# Patient Record
Sex: MALE | Race: WHITE | Employment: UNEMPLOYED
[De-identification: names, ages, dates, MRNs, and addresses within clinical notes are randomized per-mention and may not be internally consistent; named-entity substitution may affect disease eponyms.]

---

## 2017-02-14 LAB
ALBUMIN SERPL-MCNC: 4.3 G/DL
ALP BLD-CCNC: 101 U/L
ALT SERPL-CCNC: 23 U/L
AST SERPL-CCNC: 29 U/L
BASOPHILS ABSOLUTE: 0.1 /ΜL
BASOPHILS RELATIVE PERCENT: 0.7 %
BILIRUB SERPL-MCNC: 0.5 MG/DL (ref 0.1–1.4)
BUN BLDV-MCNC: 21 MG/DL
CALCIUM SERPL-MCNC: 9.3 MG/DL
CHLORIDE BLD-SCNC: 103 MMOL/L
CHOLESTEROL, TOTAL: 166 MG/DL
CHOLESTEROL/HDL RATIO: 3.1
CO2: 29 MMOL/L
CREAT SERPL-MCNC: 1.5 MG/DL
EOSINOPHILS ABSOLUTE: 0.5 /ΜL
EOSINOPHILS RELATIVE PERCENT: 6.3 %
FERRITIN: 137 NG/ML (ref 18–300)
GFR CALCULATED: NORMAL
GLUCOSE BLD-MCNC: 91 MG/DL
HCT VFR BLD CALC: 39.8 % (ref 41–53)
HDLC SERPL-MCNC: 53 MG/DL (ref 35–70)
HEMOGLOBIN: 13.3 G/DL (ref 13.5–17.5)
IRON: 82
LDL CHOLESTEROL CALCULATED: 87 MG/DL (ref 0–160)
LYMPHOCYTES ABSOLUTE: 1.2 /ΜL
LYMPHOCYTES RELATIVE PERCENT: 13.9 %
MCH RBC QN AUTO: 29.8 PG
MCHC RBC AUTO-ENTMCNC: 33.5 G/DL
MCV RBC AUTO: 89 FL
MONOCYTES ABSOLUTE: 0.5 /ΜL
MONOCYTES RELATIVE PERCENT: 6.2 %
NEUTROPHILS ABSOLUTE: 6.2 /ΜL
NEUTROPHILS RELATIVE PERCENT: 72.9 %
PDW BLD-RTO: 13.1 %
PLATELET # BLD: 238 K/ΜL
PMV BLD AUTO: 8.8 FL
POTASSIUM SERPL-SCNC: 5.1 MMOL/L
RBC # BLD: 4.47 10^6/ΜL
SODIUM BLD-SCNC: 139 MMOL/L
TOTAL IRON BINDING CAPACITY: 354
TOTAL PROTEIN: 7.4
TRIGL SERPL-MCNC: 128 MG/DL
TSH SERPL DL<=0.05 MIU/L-ACNC: 1.36 UIU/ML
URIC ACID: 6.8
VITAMIN B-12: 323
VITAMIN D 25-HYDROXY: 34.4
VITAMIN D2, 25 HYDROXY: NORMAL
VITAMIN D3,25 HYDROXY: NORMAL
VLDLC SERPL CALC-MCNC: 26 MG/DL
WBC # BLD: 8.4 10^3/ML

## 2017-02-16 DIAGNOSIS — E78.5 HYPERLIPIDEMIA, UNSPECIFIED HYPERLIPIDEMIA TYPE: ICD-10-CM

## 2017-02-16 DIAGNOSIS — F41.9 ANXIETY: ICD-10-CM

## 2017-02-16 DIAGNOSIS — Z23 NEED FOR PNEUMOCOCCAL VACCINATION: ICD-10-CM

## 2017-02-16 DIAGNOSIS — I10 ESSENTIAL HYPERTENSION: ICD-10-CM

## 2017-02-16 DIAGNOSIS — J41.0 SIMPLE CHRONIC BRONCHITIS (HCC): ICD-10-CM

## 2017-02-16 DIAGNOSIS — N18.30 CKD (CHRONIC KIDNEY DISEASE) STAGE 3, GFR 30-59 ML/MIN (HCC): ICD-10-CM

## 2017-02-16 DIAGNOSIS — N28.9 RENAL INSUFFICIENCY: ICD-10-CM

## 2017-02-16 DIAGNOSIS — D64.9 ANEMIA, UNSPECIFIED TYPE: ICD-10-CM

## 2017-02-17 ENCOUNTER — OFFICE VISIT (OUTPATIENT)
Dept: FAMILY MEDICINE CLINIC | Facility: CLINIC | Age: 67
End: 2017-02-17

## 2017-02-17 VITALS
SYSTOLIC BLOOD PRESSURE: 130 MMHG | HEIGHT: 67 IN | HEART RATE: 83 BPM | OXYGEN SATURATION: 95 % | WEIGHT: 172.4 LBS | DIASTOLIC BLOOD PRESSURE: 80 MMHG | TEMPERATURE: 97.2 F | BODY MASS INDEX: 27.06 KG/M2

## 2017-02-17 DIAGNOSIS — Z23 NEED FOR TDAP VACCINATION: ICD-10-CM

## 2017-02-17 DIAGNOSIS — E87.5 HYPERKALEMIA: ICD-10-CM

## 2017-02-17 DIAGNOSIS — N18.30 CKD (CHRONIC KIDNEY DISEASE) STAGE 3, GFR 30-59 ML/MIN (HCC): ICD-10-CM

## 2017-02-17 DIAGNOSIS — I10 ESSENTIAL HYPERTENSION: ICD-10-CM

## 2017-02-17 DIAGNOSIS — L30.9 DERMATITIS: ICD-10-CM

## 2017-02-17 DIAGNOSIS — D64.9 ANEMIA, UNSPECIFIED TYPE: ICD-10-CM

## 2017-02-17 DIAGNOSIS — F41.9 ANXIETY: ICD-10-CM

## 2017-02-17 DIAGNOSIS — J41.0 SIMPLE CHRONIC BRONCHITIS (HCC): ICD-10-CM

## 2017-02-17 DIAGNOSIS — E78.5 HYPERLIPIDEMIA, UNSPECIFIED HYPERLIPIDEMIA TYPE: ICD-10-CM

## 2017-02-17 DIAGNOSIS — M1A.9XX0 CHRONIC GOUT WITHOUT TOPHUS, UNSPECIFIED CAUSE, UNSPECIFIED SITE: Primary | ICD-10-CM

## 2017-02-17 PROCEDURE — G8427 DOCREV CUR MEDS BY ELIG CLIN: HCPCS | Performed by: PHYSICIAN ASSISTANT

## 2017-02-17 PROCEDURE — 90715 TDAP VACCINE 7 YRS/> IM: CPT | Performed by: PHYSICIAN ASSISTANT

## 2017-02-17 PROCEDURE — G8599 NO ASA/ANTIPLAT THER USE RNG: HCPCS | Performed by: PHYSICIAN ASSISTANT

## 2017-02-17 PROCEDURE — G8484 FLU IMMUNIZE NO ADMIN: HCPCS | Performed by: PHYSICIAN ASSISTANT

## 2017-02-17 PROCEDURE — G8420 CALC BMI NORM PARAMETERS: HCPCS | Performed by: PHYSICIAN ASSISTANT

## 2017-02-17 PROCEDURE — 90471 IMMUNIZATION ADMIN: CPT | Performed by: PHYSICIAN ASSISTANT

## 2017-02-17 PROCEDURE — 3017F COLORECTAL CA SCREEN DOC REV: CPT | Performed by: PHYSICIAN ASSISTANT

## 2017-02-17 PROCEDURE — 96372 THER/PROPH/DIAG INJ SC/IM: CPT | Performed by: PHYSICIAN ASSISTANT

## 2017-02-17 PROCEDURE — 99214 OFFICE O/P EST MOD 30 MIN: CPT | Performed by: PHYSICIAN ASSISTANT

## 2017-02-17 PROCEDURE — 1123F ACP DISCUSS/DSCN MKR DOCD: CPT | Performed by: PHYSICIAN ASSISTANT

## 2017-02-17 PROCEDURE — 3023F SPIROM DOC REV: CPT | Performed by: PHYSICIAN ASSISTANT

## 2017-02-17 PROCEDURE — 4004F PT TOBACCO SCREEN RCVD TLK: CPT | Performed by: PHYSICIAN ASSISTANT

## 2017-02-17 PROCEDURE — G8926 SPIRO NO PERF OR DOC: HCPCS | Performed by: PHYSICIAN ASSISTANT

## 2017-02-17 PROCEDURE — 4040F PNEUMOC VAC/ADMIN/RCVD: CPT | Performed by: PHYSICIAN ASSISTANT

## 2017-02-17 RX ORDER — AMMONIUM LACTATE 12 G/100G
LOTION TOPICAL
Qty: 222 ML | Refills: 5 | Status: SHIPPED | OUTPATIENT
Start: 2017-02-17 | End: 2018-07-06

## 2017-02-17 RX ORDER — TRIAMCINOLONE ACETONIDE 40 MG/ML
40 INJECTION, SUSPENSION INTRA-ARTICULAR; INTRAMUSCULAR ONCE
Status: COMPLETED | OUTPATIENT
Start: 2017-02-17 | End: 2017-02-17

## 2017-02-17 RX ORDER — ALPRAZOLAM 0.25 MG/1
TABLET ORAL
Qty: 90 TABLET | Refills: 2 | Status: SHIPPED | OUTPATIENT
Start: 2017-02-17 | End: 2017-05-16 | Stop reason: SDUPTHER

## 2017-02-17 RX ORDER — TRAMADOL HYDROCHLORIDE 50 MG/1
TABLET ORAL
Qty: 240 TABLET | Refills: 2 | Status: SHIPPED | OUTPATIENT
Start: 2017-02-17 | End: 2017-05-16 | Stop reason: SDUPTHER

## 2017-02-17 RX ADMIN — TRIAMCINOLONE ACETONIDE 40 MG: 40 INJECTION, SUSPENSION INTRA-ARTICULAR; INTRAMUSCULAR at 16:04

## 2017-02-17 ASSESSMENT — PATIENT HEALTH QUESTIONNAIRE - PHQ9
SUM OF ALL RESPONSES TO PHQ9 QUESTIONS 1 & 2: 1
1. LITTLE INTEREST OR PLEASURE IN DOING THINGS: 0
SUM OF ALL RESPONSES TO PHQ QUESTIONS 1-9: 1
2. FEELING DOWN, DEPRESSED OR HOPELESS: 1

## 2017-02-18 ASSESSMENT — ENCOUNTER SYMPTOMS
RHINORRHEA: 0
DIARRHEA: 0
COUGH: 0
EYE DISCHARGE: 0
NAUSEA: 0
SORE THROAT: 0
VOMITING: 0
SINUS PRESSURE: 0
CHANGE IN BOWEL HABIT: 0
SHORTNESS OF BREATH: 0
CHEST TIGHTNESS: 0
EYE ITCHING: 0
ABDOMINAL PAIN: 0

## 2017-03-06 PROBLEM — I51.7 LAE (LEFT ATRIAL ENLARGEMENT): Status: ACTIVE | Noted: 2017-03-06

## 2017-03-06 PROBLEM — I51.7 LVH (LEFT VENTRICULAR HYPERTROPHY): Status: ACTIVE | Noted: 2017-03-06

## 2017-03-13 RX ORDER — CLOBETASOL PROPIONATE 0.46 MG/ML
SOLUTION TOPICAL
Qty: 50 ML | Refills: 1 | Status: SHIPPED | OUTPATIENT
Start: 2017-03-13 | End: 2017-06-04 | Stop reason: SDUPTHER

## 2017-04-26 RX ORDER — TRIAMCINOLONE ACETONIDE 1 MG/G
CREAM TOPICAL
Qty: 1 TUBE | Refills: 1 | Status: SHIPPED | OUTPATIENT
Start: 2017-04-26 | End: 2017-05-16 | Stop reason: SDUPTHER

## 2017-04-26 RX ORDER — SIMVASTATIN 20 MG
TABLET ORAL
Qty: 30 TABLET | Refills: 5 | Status: SHIPPED | OUTPATIENT
Start: 2017-04-26 | End: 2017-10-18 | Stop reason: SDUPTHER

## 2017-05-16 ENCOUNTER — TELEPHONE (OUTPATIENT)
Dept: FAMILY MEDICINE CLINIC | Age: 67
End: 2017-05-16

## 2017-05-16 ENCOUNTER — OFFICE VISIT (OUTPATIENT)
Dept: FAMILY MEDICINE CLINIC | Age: 67
End: 2017-05-16
Payer: MEDICARE

## 2017-05-16 VITALS
HEIGHT: 67 IN | BODY MASS INDEX: 26.19 KG/M2 | SYSTOLIC BLOOD PRESSURE: 130 MMHG | WEIGHT: 166.9 LBS | DIASTOLIC BLOOD PRESSURE: 80 MMHG | HEART RATE: 100 BPM | OXYGEN SATURATION: 97 % | TEMPERATURE: 97.5 F

## 2017-05-16 DIAGNOSIS — N18.30 CKD (CHRONIC KIDNEY DISEASE) STAGE 3, GFR 30-59 ML/MIN (HCC): ICD-10-CM

## 2017-05-16 DIAGNOSIS — I42.9 CARDIOMYOPATHY (HCC): Chronic | ICD-10-CM

## 2017-05-16 DIAGNOSIS — I25.5 ISCHEMIC CARDIOMYOPATHY: ICD-10-CM

## 2017-05-16 DIAGNOSIS — D64.9 ANEMIA, UNSPECIFIED TYPE: ICD-10-CM

## 2017-05-16 DIAGNOSIS — E78.5 HYPERLIPIDEMIA, UNSPECIFIED HYPERLIPIDEMIA TYPE: ICD-10-CM

## 2017-05-16 DIAGNOSIS — J41.0 SIMPLE CHRONIC BRONCHITIS (HCC): ICD-10-CM

## 2017-05-16 DIAGNOSIS — I10 ESSENTIAL HYPERTENSION: Primary | Chronic | ICD-10-CM

## 2017-05-16 DIAGNOSIS — F41.9 ANXIETY: ICD-10-CM

## 2017-05-16 DIAGNOSIS — N28.9 RENAL INSUFFICIENCY: ICD-10-CM

## 2017-05-16 DIAGNOSIS — M1A.3790 CHRONIC GOUT DUE TO RENAL IMPAIRMENT INVOLVING FOOT WITHOUT TOPHUS, UNSPECIFIED LATERALITY: ICD-10-CM

## 2017-05-16 DIAGNOSIS — Z12.5 SCREENING FOR PROSTATE CANCER: ICD-10-CM

## 2017-05-16 PROCEDURE — 4004F PT TOBACCO SCREEN RCVD TLK: CPT | Performed by: PHYSICIAN ASSISTANT

## 2017-05-16 PROCEDURE — G8427 DOCREV CUR MEDS BY ELIG CLIN: HCPCS | Performed by: PHYSICIAN ASSISTANT

## 2017-05-16 PROCEDURE — G8420 CALC BMI NORM PARAMETERS: HCPCS | Performed by: PHYSICIAN ASSISTANT

## 2017-05-16 PROCEDURE — 1123F ACP DISCUSS/DSCN MKR DOCD: CPT | Performed by: PHYSICIAN ASSISTANT

## 2017-05-16 PROCEDURE — G8599 NO ASA/ANTIPLAT THER USE RNG: HCPCS | Performed by: PHYSICIAN ASSISTANT

## 2017-05-16 PROCEDURE — 3017F COLORECTAL CA SCREEN DOC REV: CPT | Performed by: PHYSICIAN ASSISTANT

## 2017-05-16 PROCEDURE — 3023F SPIROM DOC REV: CPT | Performed by: PHYSICIAN ASSISTANT

## 2017-05-16 PROCEDURE — G8926 SPIRO NO PERF OR DOC: HCPCS | Performed by: PHYSICIAN ASSISTANT

## 2017-05-16 PROCEDURE — 4040F PNEUMOC VAC/ADMIN/RCVD: CPT | Performed by: PHYSICIAN ASSISTANT

## 2017-05-16 PROCEDURE — 99214 OFFICE O/P EST MOD 30 MIN: CPT | Performed by: PHYSICIAN ASSISTANT

## 2017-05-16 RX ORDER — TRIAMCINOLONE ACETONIDE 1 MG/G
CREAM TOPICAL
Qty: 1 TUBE | Refills: 1 | Status: SHIPPED | OUTPATIENT
Start: 2017-05-16 | End: 2018-05-02 | Stop reason: ALTCHOICE

## 2017-05-16 RX ORDER — ALPRAZOLAM 0.25 MG/1
TABLET ORAL
Qty: 90 TABLET | Refills: 2 | Status: SHIPPED | OUTPATIENT
Start: 2017-05-16 | End: 2017-08-15 | Stop reason: SDUPTHER

## 2017-05-16 RX ORDER — TRAMADOL HYDROCHLORIDE 50 MG/1
TABLET ORAL
Qty: 240 TABLET | Refills: 2 | Status: SHIPPED | OUTPATIENT
Start: 2017-05-16 | End: 2017-08-15 | Stop reason: SDUPTHER

## 2017-05-16 RX ORDER — TRIAMCINOLONE ACETONIDE 40 MG/ML
40 INJECTION, SUSPENSION INTRA-ARTICULAR; INTRAMUSCULAR ONCE
Status: COMPLETED | OUTPATIENT
Start: 2017-05-16 | End: 2017-05-16

## 2017-05-16 RX ADMIN — TRIAMCINOLONE ACETONIDE 40 MG: 40 INJECTION, SUSPENSION INTRA-ARTICULAR; INTRAMUSCULAR at 15:13

## 2017-05-16 ASSESSMENT — ENCOUNTER SYMPTOMS
CHEST TIGHTNESS: 0
CHANGE IN BOWEL HABIT: 0
EYE DISCHARGE: 0
EYE ITCHING: 0
SINUS PRESSURE: 0
NAUSEA: 0
ABDOMINAL PAIN: 0

## 2017-05-23 ENCOUNTER — OFFICE VISIT (OUTPATIENT)
Dept: FAMILY MEDICINE CLINIC | Age: 67
End: 2017-05-23
Payer: MEDICARE

## 2017-05-23 VITALS
SYSTOLIC BLOOD PRESSURE: 125 MMHG | WEIGHT: 162 LBS | HEIGHT: 67 IN | HEART RATE: 90 BPM | BODY MASS INDEX: 25.43 KG/M2 | OXYGEN SATURATION: 97 % | TEMPERATURE: 97.9 F | DIASTOLIC BLOOD PRESSURE: 82 MMHG

## 2017-05-23 DIAGNOSIS — M79.672 LEFT FOOT PAIN: Primary | ICD-10-CM

## 2017-05-23 PROCEDURE — 4004F PT TOBACCO SCREEN RCVD TLK: CPT | Performed by: PHYSICIAN ASSISTANT

## 2017-05-23 PROCEDURE — 1123F ACP DISCUSS/DSCN MKR DOCD: CPT | Performed by: PHYSICIAN ASSISTANT

## 2017-05-23 PROCEDURE — 4040F PNEUMOC VAC/ADMIN/RCVD: CPT | Performed by: PHYSICIAN ASSISTANT

## 2017-05-23 PROCEDURE — G8427 DOCREV CUR MEDS BY ELIG CLIN: HCPCS | Performed by: PHYSICIAN ASSISTANT

## 2017-05-23 PROCEDURE — 99213 OFFICE O/P EST LOW 20 MIN: CPT | Performed by: PHYSICIAN ASSISTANT

## 2017-05-23 PROCEDURE — G8420 CALC BMI NORM PARAMETERS: HCPCS | Performed by: PHYSICIAN ASSISTANT

## 2017-05-23 PROCEDURE — G8599 NO ASA/ANTIPLAT THER USE RNG: HCPCS | Performed by: PHYSICIAN ASSISTANT

## 2017-05-23 PROCEDURE — 3017F COLORECTAL CA SCREEN DOC REV: CPT | Performed by: PHYSICIAN ASSISTANT

## 2017-05-23 RX ORDER — PREDNISONE 20 MG/1
TABLET ORAL
Qty: 18 TABLET | Refills: 0 | Status: SHIPPED | OUTPATIENT
Start: 2017-05-23 | End: 2017-06-02

## 2017-05-23 ASSESSMENT — ENCOUNTER SYMPTOMS
CHEST TIGHTNESS: 0
SORE THROAT: 0
VOMITING: 0
NAUSEA: 0
SINUS PRESSURE: 0
ABDOMINAL PAIN: 0
SWOLLEN GLANDS: 0
VISUAL CHANGE: 0
EYE ITCHING: 0
EYE DISCHARGE: 0
COUGH: 0
CHANGE IN BOWEL HABIT: 0

## 2017-06-30 RX ORDER — LISINOPRIL 10 MG/1
TABLET ORAL
Qty: 90 TABLET | Refills: 3 | Status: SHIPPED | OUTPATIENT
Start: 2017-06-30 | End: 2018-06-27 | Stop reason: SDUPTHER

## 2017-08-15 ENCOUNTER — OFFICE VISIT (OUTPATIENT)
Dept: FAMILY MEDICINE CLINIC | Age: 67
End: 2017-08-15
Payer: MEDICARE

## 2017-08-15 VITALS
HEART RATE: 85 BPM | BODY MASS INDEX: 26.21 KG/M2 | SYSTOLIC BLOOD PRESSURE: 130 MMHG | HEIGHT: 67 IN | DIASTOLIC BLOOD PRESSURE: 71 MMHG | TEMPERATURE: 98.7 F | WEIGHT: 167 LBS | OXYGEN SATURATION: 97 %

## 2017-08-15 DIAGNOSIS — F41.9 ANXIETY: ICD-10-CM

## 2017-08-15 DIAGNOSIS — G89.29 CHRONIC SHOULDER PAIN, UNSPECIFIED LATERALITY: ICD-10-CM

## 2017-08-15 DIAGNOSIS — Z23 NEED FOR INFLUENZA VACCINATION: ICD-10-CM

## 2017-08-15 DIAGNOSIS — N18.30 CKD (CHRONIC KIDNEY DISEASE) STAGE 3, GFR 30-59 ML/MIN (HCC): ICD-10-CM

## 2017-08-15 DIAGNOSIS — M25.519 CHRONIC SHOULDER PAIN, UNSPECIFIED LATERALITY: ICD-10-CM

## 2017-08-15 DIAGNOSIS — J41.0 SIMPLE CHRONIC BRONCHITIS (HCC): ICD-10-CM

## 2017-08-15 DIAGNOSIS — L40.9 PSORIASIS: ICD-10-CM

## 2017-08-15 DIAGNOSIS — M1A.3790 CHRONIC GOUT DUE TO RENAL IMPAIRMENT INVOLVING FOOT WITHOUT TOPHUS, UNSPECIFIED LATERALITY: ICD-10-CM

## 2017-08-15 DIAGNOSIS — D64.9 ANEMIA, UNSPECIFIED TYPE: ICD-10-CM

## 2017-08-15 DIAGNOSIS — E78.5 HYPERLIPIDEMIA, UNSPECIFIED HYPERLIPIDEMIA TYPE: ICD-10-CM

## 2017-08-15 DIAGNOSIS — N28.9 RENAL INSUFFICIENCY: ICD-10-CM

## 2017-08-15 DIAGNOSIS — I10 ESSENTIAL HYPERTENSION: Primary | Chronic | ICD-10-CM

## 2017-08-15 DIAGNOSIS — F10.10 ALCOHOL ABUSE: ICD-10-CM

## 2017-08-15 DIAGNOSIS — F10.29 ALCOHOL DEPENDENCE WITH UNSPECIFIED ALCOHOL-INDUCED DISORDER (HCC): ICD-10-CM

## 2017-08-15 PROCEDURE — G8427 DOCREV CUR MEDS BY ELIG CLIN: HCPCS | Performed by: PHYSICIAN ASSISTANT

## 2017-08-15 PROCEDURE — G8599 NO ASA/ANTIPLAT THER USE RNG: HCPCS | Performed by: PHYSICIAN ASSISTANT

## 2017-08-15 PROCEDURE — G0008 ADMIN INFLUENZA VIRUS VAC: HCPCS | Performed by: PHYSICIAN ASSISTANT

## 2017-08-15 PROCEDURE — 3023F SPIROM DOC REV: CPT | Performed by: PHYSICIAN ASSISTANT

## 2017-08-15 PROCEDURE — 99213 OFFICE O/P EST LOW 20 MIN: CPT | Performed by: PHYSICIAN ASSISTANT

## 2017-08-15 PROCEDURE — 96372 THER/PROPH/DIAG INJ SC/IM: CPT | Performed by: PHYSICIAN ASSISTANT

## 2017-08-15 PROCEDURE — G8926 SPIRO NO PERF OR DOC: HCPCS | Performed by: PHYSICIAN ASSISTANT

## 2017-08-15 PROCEDURE — 4040F PNEUMOC VAC/ADMIN/RCVD: CPT | Performed by: PHYSICIAN ASSISTANT

## 2017-08-15 PROCEDURE — G8419 CALC BMI OUT NRM PARAM NOF/U: HCPCS | Performed by: PHYSICIAN ASSISTANT

## 2017-08-15 PROCEDURE — 90662 IIV NO PRSV INCREASED AG IM: CPT | Performed by: PHYSICIAN ASSISTANT

## 2017-08-15 PROCEDURE — 4004F PT TOBACCO SCREEN RCVD TLK: CPT | Performed by: PHYSICIAN ASSISTANT

## 2017-08-15 PROCEDURE — 1123F ACP DISCUSS/DSCN MKR DOCD: CPT | Performed by: PHYSICIAN ASSISTANT

## 2017-08-15 PROCEDURE — 3017F COLORECTAL CA SCREEN DOC REV: CPT | Performed by: PHYSICIAN ASSISTANT

## 2017-08-15 RX ORDER — TRAMADOL HYDROCHLORIDE 50 MG/1
TABLET ORAL
Qty: 240 TABLET | Refills: 2 | Status: SHIPPED | OUTPATIENT
Start: 2017-08-15 | End: 2017-11-14 | Stop reason: SDUPTHER

## 2017-08-15 RX ORDER — TRIAMCINOLONE ACETONIDE 40 MG/ML
40 INJECTION, SUSPENSION INTRA-ARTICULAR; INTRAMUSCULAR ONCE
Status: COMPLETED | OUTPATIENT
Start: 2017-08-15 | End: 2017-08-15

## 2017-08-15 RX ORDER — ALPRAZOLAM 0.25 MG/1
TABLET ORAL
Qty: 90 TABLET | Refills: 2 | Status: SHIPPED | OUTPATIENT
Start: 2017-08-15 | End: 2017-11-14 | Stop reason: SDUPTHER

## 2017-08-15 RX ADMIN — TRIAMCINOLONE ACETONIDE 40 MG: 40 INJECTION, SUSPENSION INTRA-ARTICULAR; INTRAMUSCULAR at 14:53

## 2017-08-15 ASSESSMENT — ENCOUNTER SYMPTOMS
RHINORRHEA: 0
CHANGE IN BOWEL HABIT: 0
EYE ITCHING: 0
SINUS PRESSURE: 0
CHEST TIGHTNESS: 0
VOMITING: 0
ABDOMINAL PAIN: 0
DIARRHEA: 0
NAUSEA: 0
BLURRED VISION: 0
EYE DISCHARGE: 0
SHORTNESS OF BREATH: 0
SORE THROAT: 0
COUGH: 0
ORTHOPNEA: 0

## 2017-09-11 PROBLEM — R93.1 ABNORMAL ECHOCARDIOGRAM: Status: ACTIVE | Noted: 2017-09-11

## 2017-10-18 RX ORDER — SIMVASTATIN 20 MG
TABLET ORAL
Qty: 30 TABLET | Refills: 5 | Status: SHIPPED | OUTPATIENT
Start: 2017-10-18 | End: 2018-04-17 | Stop reason: SDUPTHER

## 2017-11-13 LAB
BUN BLDV-MCNC: 20 MG/DL
CALCIUM SERPL-MCNC: 9.1 MG/DL
CHLORIDE BLD-SCNC: 101 MMOL/L
CO2: 30 MMOL/L
CREAT SERPL-MCNC: 1.55 MG/DL
GFR CALCULATED: 45
GLUCOSE BLD-MCNC: 84 MG/DL
POTASSIUM SERPL-SCNC: 4.3 MMOL/L
SODIUM BLD-SCNC: 138 MMOL/L

## 2017-11-14 ENCOUNTER — OFFICE VISIT (OUTPATIENT)
Dept: FAMILY MEDICINE CLINIC | Age: 67
End: 2017-11-14
Payer: MEDICARE

## 2017-11-14 VITALS
SYSTOLIC BLOOD PRESSURE: 122 MMHG | OXYGEN SATURATION: 97 % | HEART RATE: 96 BPM | BODY MASS INDEX: 27 KG/M2 | DIASTOLIC BLOOD PRESSURE: 80 MMHG | TEMPERATURE: 97.6 F | HEIGHT: 67 IN | WEIGHT: 172 LBS

## 2017-11-14 DIAGNOSIS — J41.0 SIMPLE CHRONIC BRONCHITIS (HCC): ICD-10-CM

## 2017-11-14 DIAGNOSIS — I25.5 ISCHEMIC CARDIOMYOPATHY: Chronic | ICD-10-CM

## 2017-11-14 DIAGNOSIS — I10 ESSENTIAL HYPERTENSION: Primary | Chronic | ICD-10-CM

## 2017-11-14 DIAGNOSIS — Z13.6 SCREENING FOR AAA (ABDOMINAL AORTIC ANEURYSM): ICD-10-CM

## 2017-11-14 DIAGNOSIS — E78.5 HYPERLIPIDEMIA, UNSPECIFIED HYPERLIPIDEMIA TYPE: ICD-10-CM

## 2017-11-14 DIAGNOSIS — Z11.59 NEED FOR HEPATITIS C SCREENING TEST: ICD-10-CM

## 2017-11-14 DIAGNOSIS — F41.9 ANXIETY: ICD-10-CM

## 2017-11-14 DIAGNOSIS — D64.9 ANEMIA, UNSPECIFIED TYPE: ICD-10-CM

## 2017-11-14 DIAGNOSIS — M1A.9XX0 CHRONIC GOUT WITHOUT TOPHUS, UNSPECIFIED CAUSE, UNSPECIFIED SITE: ICD-10-CM

## 2017-11-14 DIAGNOSIS — L08.9 SKIN INFLAMMATION: ICD-10-CM

## 2017-11-14 DIAGNOSIS — G89.29 OTHER CHRONIC PAIN: ICD-10-CM

## 2017-11-14 DIAGNOSIS — N18.30 CKD (CHRONIC KIDNEY DISEASE) STAGE 3, GFR 30-59 ML/MIN (HCC): ICD-10-CM

## 2017-11-14 PROCEDURE — 3023F SPIROM DOC REV: CPT | Performed by: PHYSICIAN ASSISTANT

## 2017-11-14 PROCEDURE — 1123F ACP DISCUSS/DSCN MKR DOCD: CPT | Performed by: PHYSICIAN ASSISTANT

## 2017-11-14 PROCEDURE — G8427 DOCREV CUR MEDS BY ELIG CLIN: HCPCS | Performed by: PHYSICIAN ASSISTANT

## 2017-11-14 PROCEDURE — 4040F PNEUMOC VAC/ADMIN/RCVD: CPT | Performed by: PHYSICIAN ASSISTANT

## 2017-11-14 PROCEDURE — G8484 FLU IMMUNIZE NO ADMIN: HCPCS | Performed by: PHYSICIAN ASSISTANT

## 2017-11-14 PROCEDURE — 4004F PT TOBACCO SCREEN RCVD TLK: CPT | Performed by: PHYSICIAN ASSISTANT

## 2017-11-14 PROCEDURE — 3017F COLORECTAL CA SCREEN DOC REV: CPT | Performed by: PHYSICIAN ASSISTANT

## 2017-11-14 PROCEDURE — G8417 CALC BMI ABV UP PARAM F/U: HCPCS | Performed by: PHYSICIAN ASSISTANT

## 2017-11-14 PROCEDURE — G8599 NO ASA/ANTIPLAT THER USE RNG: HCPCS | Performed by: PHYSICIAN ASSISTANT

## 2017-11-14 PROCEDURE — 99214 OFFICE O/P EST MOD 30 MIN: CPT | Performed by: PHYSICIAN ASSISTANT

## 2017-11-14 PROCEDURE — G8926 SPIRO NO PERF OR DOC: HCPCS | Performed by: PHYSICIAN ASSISTANT

## 2017-11-14 RX ORDER — ALBUTEROL SULFATE 90 UG/1
2 AEROSOL, METERED RESPIRATORY (INHALATION) EVERY 6 HOURS PRN
Qty: 1 INHALER | Refills: 3 | Status: SHIPPED | OUTPATIENT
Start: 2017-11-14 | End: 2018-03-14 | Stop reason: ALTCHOICE

## 2017-11-14 RX ORDER — ALPRAZOLAM 0.25 MG/1
TABLET ORAL
Qty: 90 TABLET | Refills: 2 | Status: SHIPPED | OUTPATIENT
Start: 2017-11-14 | End: 2018-02-12 | Stop reason: SDUPTHER

## 2017-11-14 RX ORDER — TRIAMCINOLONE ACETONIDE 40 MG/ML
40 INJECTION, SUSPENSION INTRA-ARTICULAR; INTRAMUSCULAR ONCE
Status: COMPLETED | OUTPATIENT
Start: 2017-11-14 | End: 2017-11-14

## 2017-11-14 RX ORDER — TRAMADOL HYDROCHLORIDE 50 MG/1
TABLET ORAL
Qty: 240 TABLET | Refills: 2 | Status: SHIPPED | OUTPATIENT
Start: 2017-11-14 | End: 2018-02-12 | Stop reason: SDUPTHER

## 2017-11-14 RX ADMIN — TRIAMCINOLONE ACETONIDE 40 MG: 40 INJECTION, SUSPENSION INTRA-ARTICULAR; INTRAMUSCULAR at 14:47

## 2017-11-14 ASSESSMENT — ENCOUNTER SYMPTOMS
NAUSEA: 0
COUGH: 0
ABDOMINAL PAIN: 0
CHEST TIGHTNESS: 0
SHORTNESS OF BREATH: 0
BLURRED VISION: 0
SINUS PRESSURE: 0
RHINORRHEA: 0
BOWEL INCONTINENCE: 0
DIARRHEA: 0
ORTHOPNEA: 0
SORE THROAT: 0
VOMITING: 0
EYE ITCHING: 0
EYE DISCHARGE: 0

## 2017-11-14 NOTE — PROGRESS NOTES
malaise/fatigue. Pertinent negatives include no blurred vision, chest pain, headaches, neck pain, orthopnea, palpitations, PND, shortness of breath or sweats. Risk factors for coronary artery disease include male gender. The current treatment provides mild improvement. Hypertensive end-organ damage includes kidney disease. There is no history of heart failure or a thyroid problem. Identifiable causes of hypertension include chronic renal disease. There is no history of sleep apnea. Alcohol Problem   Pertinent negatives include no weakness. This is a chronic problem. Pertinent negatives include no bladder incontinence, bowel incontinence, fever, nausea or vomiting. Hyperlipidemia   This is a chronic problem. Exacerbating diseases include chronic renal disease. He has no history of diabetes, hypothyroidism, liver disease, obesity or nephrotic syndrome. Pertinent negatives include no chest pain, focal sensory loss, focal weakness or shortness of breath. Mental Health Problem     The onset of the illness is precipitated by emotional stress. Additional symptoms of the illness do not include anhedonia, insomnia, hypersomnia, appetite change, fatigue, psychomotor retardation, feelings of worthlessness, headaches or abdominal pain. He does not admit to suicidal ideas. He does not have a plan to commit suicide. He does not contemplate harming himself. He has not already injured self. He does not contemplate injuring another person. He has not already  injured another person.        No results found for: LABA1C          ( goal A1C is < 7)   No results found for: LABMICR  LDL Calculated (mg/dL)   Date Value   02/14/2017 87       (goal LDL is <100)   AST (U/L)   Date Value   02/14/2017 29     ALT (U/L)   Date Value   02/14/2017 23     BUN (mg/dL)   Date Value   02/14/2017 21     BP Readings from Last 3 Encounters:   11/14/17 122/80   09/11/17 120/65   08/23/17 120/80          (goal 120/80)    Past Medical History: Diagnosis Date    AICD (automatic cardioverter/defibrillator) present 2/14    [employer]    Anxiety     Anxiety state, unspecified     ASHD (arteriosclerotic heart disease) 4/1/2015    Cardiac cath 2013, showed multivessels ds with collaterals    Chronic kidney disease     CKD (chronic kidney disease) stage 3, GFR 30-59 ml/min 4/1/2015    From ischemic nephroscleriosis, baseline creat of 1.5-1.7, GFR 40-45 ml/min, kidney size about 10 cm bilaterally.  Contact dermatitis and other eczema, due to unspecified cause     COPD (chronic obstructive pulmonary disease) ([facility] [address] 4/1/2015    Coronary atherosclerosis of unspecified type of vessel, native or graft     Hyperlipidemia     Hypertension     Ischemic cardiomyopathy 4/1/2015    EF 15-20%    Osteoarthrosis, unspecified whether generalized or localized, unspecified site     Psoriasis     Recurrent Gout 4/1/2015    Shortness of breath     Unspecified essential hypertension       Past Surgical History:   Procedure Laterality Date    CARDIAC CATHETERIZATION  8/13    diffuse LAD/LCx, occluded RCA with collaterals     CARDIAC DEFIBRILLATOR PLACEMENT  2/14    [employer]     CHOLECYSTECTOMY      RECTAL SURGERY      fistula        Family History   Problem Relation Age of Onset    Heart Attack Other        Social History   Substance Use Topics    Smoking status: Former Smoker     Quit date: 5/19/2000    Smokeless tobacco: Current User    Alcohol use 0.0 oz/week      Comment: rare       Current Outpatient Prescriptions   Medication Sig Dispense Refill    traMADol (ULTRAM) 50 MG tablet take 2 tablets by mouth four times a day if needed. 240 tablet 2    ALPRAZolam (XANAX) 0.25 MG tablet take 1 tablet by mouth three times a day if needed.  90 tablet 2    albuterol sulfate HFA (PROAIR HFA) 108 (90 Base) MCG/ACT inhaler Inhale 2 puffs into the lungs every 6 hours as needed for Wheezing 1 Inhaler 3    simvastatin (ZOCOR) 20 MG tablet take 1 tablet by mouth every evening 30 tablet 5    isosorbide mononitrate (IMDUR) 30 MG extended release tablet take 1 tablet by mouth once daily 30 tablet 6    lisinopril (PRINIVIL;ZESTRIL) 10 MG tablet take 1 tablet by mouth once daily 90 tablet 3    clobetasol (TEMOVATE) 0.05 % external solution apply to affected area twice a day 50 mL 1    triamcinolone (KENALOG) 0.1 % cream apply to affected area twice a day 1 Tube 1    carvedilol (COREG) 25 MG tablet take 1 tablet by mouth twice a day with meals 180 tablet 3    amLODIPine (NORVASC) 2.5 MG tablet Take 1 tablet by mouth daily 90 tablet 3    ammonium lactate (LAC-HYDRIN) 12 % lotion Apply topically daily. 222 mL 5    SPIRIVA HANDIHALER 18 MCG inhalation capsule inhale the contents of one capsule in the handihaler PRN  0    NITROSTAT 0.3 MG SL tablet        Current Facility-Administered Medications   Medication Dose Route Frequency Provider Last Rate Last Dose    triamcinolone acetonide (KENALOG-40) injection 40 mg  40 mg Intramuscular Once Jamie Burns PA-C         No Known Allergies    Health Maintenance   Topic Date Due    AAA screen  1950    Hepatitis C screen  1950    Colon cancer screen colonoscopy  02/05/2020    Lipid screen  02/14/2022    DTaP/Tdap/Td vaccine (2 - Td) 02/17/2027    Zostavax vaccine  Completed    Flu vaccine  Completed    Pneumococcal low/med risk  Completed       Subjective:      Review of Systems   Constitutional: Positive for malaise/fatigue. Negative for appetite change, chills, diaphoresis, fatigue and fever. HENT: Negative for congestion, ear discharge, ear pain, postnasal drip, rhinorrhea, sinus pressure and sore throat. Eyes: Negative for blurred vision, discharge and itching. Respiratory: Negative for cough, chest tightness and shortness of breath. Cardiovascular: Negative for chest pain, palpitations, orthopnea, leg swelling and PND.    Gastrointestinal: Negative for abdominal pain, bowel incontinence, (Oral)   Ht 5' 7\" (1.702 m)   Wt 172 lb (78 kg)   SpO2 97%   BMI 26.94 kg/m²     Assessment:      1. Essential hypertension  CBC Auto Differential    Comprehensive Metabolic Panel    Lipid Panel    TSH with Reflex    Vitamin D 25 Hydroxy    Iron and TIBC    Ferritin    Folate    Vitamin B1    Vitamin B12   2. Chronic gout without tophus, unspecified cause, unspecified site  Uric Acid   3. Simple chronic bronchitis (HCC)  CBC Auto Differential    Comprehensive Metabolic Panel    Lipid Panel    TSH with Reflex    Vitamin D 25 Hydroxy    Iron and TIBC    Ferritin    Folate    Vitamin B1    Vitamin B12   4. CKD (chronic kidney disease) stage 3, GFR 30-59 ml/min  CBC Auto Differential    Comprehensive Metabolic Panel    Lipid Panel    TSH with Reflex    Vitamin D 25 Hydroxy    Iron and TIBC    Ferritin    Folate    Vitamin B1    Vitamin B12   5. Anemia, unspecified type  CBC Auto Differential    Comprehensive Metabolic Panel    Lipid Panel    TSH with Reflex    Vitamin D 25 Hydroxy    Iron and TIBC    Ferritin    Folate    Vitamin B1    Vitamin B12   6. Hyperlipidemia, unspecified hyperlipidemia type  CBC Auto Differential    Comprehensive Metabolic Panel    Lipid Panel    TSH with Reflex    Vitamin D 25 Hydroxy    Iron and TIBC    Ferritin    Folate    Vitamin B1    Vitamin B12   7. Anxiety  CBC Auto Differential    Comprehensive Metabolic Panel    Lipid Panel    TSH with Reflex    Vitamin D 25 Hydroxy    Iron and TIBC    Ferritin    Folate    Vitamin B1    Vitamin B12   8. Ischemic cardiomyopathy  CBC Auto Differential    Comprehensive Metabolic Panel    Lipid Panel    TSH with Reflex    Vitamin D 25 Hydroxy    Iron and TIBC    Ferritin    Folate    Vitamin B1    Vitamin B12   9. Other chronic pain  CBC Auto Differential    Comprehensive Metabolic Panel    Lipid Panel    TSH with Reflex    Vitamin D 25 Hydroxy    Iron and TIBC    Ferritin    Folate    Vitamin B1    Vitamin B12   10.  Need for hepatitis C screening test  Hepatitis C Antibody   11. Skin inflammation  CBC Auto Differential    Comprehensive Metabolic Panel    Lipid Panel    TSH with Reflex    Vitamin D 25 Hydroxy    Iron and TIBC    Ferritin    Folate    Vitamin B1    Vitamin B12   12. Screening for AAA (abdominal aortic aneurysm)  US SCREENING FOR AAA             Plan:      No Follow-up on file. Orders Placed This Encounter   Procedures    US SCREENING FOR AAA     Standing Status:   Future     Standing Expiration Date:   11/14/2018    Hepatitis C Antibody     Standing Status:   Future     Standing Expiration Date:   11/14/2018    CBC Auto Differential     Standing Status:   Future     Standing Expiration Date:   11/14/2018    Comprehensive Metabolic Panel     Standing Status:   Future     Standing Expiration Date:   11/14/2018    Lipid Panel     Standing Status:   Future     Standing Expiration Date:   11/14/2018     Order Specific Question:   Is Patient Fasting?/# of Hours     Answer:   yes    TSH with Reflex     Standing Status:   Future     Standing Expiration Date:   11/14/2018    Vitamin D 25 Hydroxy     Standing Status:   Future     Standing Expiration Date:   11/14/2018    Iron and TIBC     Standing Status:   Future     Standing Expiration Date:   5/13/2018     Order Specific Question:   Is Patient Fasting? Answer:   No     Order Specific Question:   No of Hours?      Answer:   none    Ferritin     Standing Status:   Future     Standing Expiration Date:   11/14/2018    Folate     Standing Status:   Future     Standing Expiration Date:   11/14/2018    Vitamin B1     Standing Status:   Future     Standing Expiration Date:   11/14/2018    Vitamin B12     Standing Status:   Future     Standing Expiration Date:   2/12/2018    Uric Acid     Standing Status:   Future     Standing Expiration Date:   11/14/2018     Orders Placed This Encounter   Medications    traMADol (ULTRAM) 50 MG tablet     Sig: take 2 tablets by mouth four times a day if

## 2017-11-15 DIAGNOSIS — E87.5 HYPERKALEMIA: ICD-10-CM

## 2017-11-15 DIAGNOSIS — N18.30 CKD (CHRONIC KIDNEY DISEASE) STAGE 3, GFR 30-59 ML/MIN (HCC): ICD-10-CM

## 2017-12-13 ENCOUNTER — OFFICE VISIT (OUTPATIENT)
Dept: FAMILY MEDICINE CLINIC | Age: 67
End: 2017-12-13
Payer: MEDICARE

## 2017-12-13 VITALS
OXYGEN SATURATION: 97 % | TEMPERATURE: 97.9 F | WEIGHT: 166.8 LBS | HEART RATE: 96 BPM | DIASTOLIC BLOOD PRESSURE: 78 MMHG | BODY MASS INDEX: 26.18 KG/M2 | SYSTOLIC BLOOD PRESSURE: 120 MMHG | HEIGHT: 67 IN

## 2017-12-13 DIAGNOSIS — R21 RASH: Primary | ICD-10-CM

## 2017-12-13 PROCEDURE — G8599 NO ASA/ANTIPLAT THER USE RNG: HCPCS | Performed by: PHYSICIAN ASSISTANT

## 2017-12-13 PROCEDURE — 99213 OFFICE O/P EST LOW 20 MIN: CPT | Performed by: PHYSICIAN ASSISTANT

## 2017-12-13 PROCEDURE — G8484 FLU IMMUNIZE NO ADMIN: HCPCS | Performed by: PHYSICIAN ASSISTANT

## 2017-12-13 PROCEDURE — G8427 DOCREV CUR MEDS BY ELIG CLIN: HCPCS | Performed by: PHYSICIAN ASSISTANT

## 2017-12-13 PROCEDURE — 1123F ACP DISCUSS/DSCN MKR DOCD: CPT | Performed by: PHYSICIAN ASSISTANT

## 2017-12-13 PROCEDURE — 3017F COLORECTAL CA SCREEN DOC REV: CPT | Performed by: PHYSICIAN ASSISTANT

## 2017-12-13 PROCEDURE — 4004F PT TOBACCO SCREEN RCVD TLK: CPT | Performed by: PHYSICIAN ASSISTANT

## 2017-12-13 PROCEDURE — 4040F PNEUMOC VAC/ADMIN/RCVD: CPT | Performed by: PHYSICIAN ASSISTANT

## 2017-12-13 PROCEDURE — G8417 CALC BMI ABV UP PARAM F/U: HCPCS | Performed by: PHYSICIAN ASSISTANT

## 2017-12-13 RX ORDER — PREDNISONE 20 MG/1
20 TABLET ORAL 2 TIMES DAILY
Qty: 10 TABLET | Refills: 0 | Status: SHIPPED | OUTPATIENT
Start: 2017-12-13 | End: 2017-12-18

## 2017-12-22 ASSESSMENT — ENCOUNTER SYMPTOMS
CHEST TIGHTNESS: 0
SINUS PRESSURE: 0
RHINORRHEA: 0
SHORTNESS OF BREATH: 0
COUGH: 0
NAUSEA: 0
EYE DISCHARGE: 0
ABDOMINAL PAIN: 0
EYE PAIN: 0
DIARRHEA: 0
VOMITING: 0
EYE ITCHING: 0
SORE THROAT: 0

## 2017-12-22 NOTE — PROGRESS NOTES
localized, unspecified site     Psoriasis     Recurrent Gout 4/1/2015    Shortness of breath     Unspecified essential hypertension       Past Surgical History:   Procedure Laterality Date    CARDIAC CATHETERIZATION  8/13    diffuse LAD/LCx, occluded RCA with collaterals     CARDIAC DEFIBRILLATOR PLACEMENT  2/14    [employer]     CHOLECYSTECTOMY      RECTAL SURGERY      fistula        Family History   Problem Relation Age of Onset    Heart Attack Other        Social History   Substance Use Topics    Smoking status: Former Smoker     Quit date: 5/19/2000    Smokeless tobacco: Current User    Alcohol use 0.0 oz/week      Comment: rare       Current Outpatient Prescriptions   Medication Sig Dispense Refill    traMADol (ULTRAM) 50 MG tablet take 2 tablets by mouth four times a day if needed. 240 tablet 2    ALPRAZolam (XANAX) 0.25 MG tablet take 1 tablet by mouth three times a day if needed. 90 tablet 2    albuterol sulfate HFA (PROAIR HFA) 108 (90 Base) MCG/ACT inhaler Inhale 2 puffs into the lungs every 6 hours as needed for Wheezing 1 Inhaler 3    simvastatin (ZOCOR) 20 MG tablet take 1 tablet by mouth every evening 30 tablet 5    isosorbide mononitrate (IMDUR) 30 MG extended release tablet take 1 tablet by mouth once daily 30 tablet 6    lisinopril (PRINIVIL;ZESTRIL) 10 MG tablet take 1 tablet by mouth once daily 90 tablet 3    clobetasol (TEMOVATE) 0.05 % external solution apply to affected area twice a day 50 mL 1    triamcinolone (KENALOG) 0.1 % cream apply to affected area twice a day 1 Tube 1    carvedilol (COREG) 25 MG tablet take 1 tablet by mouth twice a day with meals 180 tablet 3    amLODIPine (NORVASC) 2.5 MG tablet Take 1 tablet by mouth daily 90 tablet 3    ammonium lactate (LAC-HYDRIN) 12 % lotion Apply topically daily.  222 mL 5    SPIRIVA HANDIHALER 18 MCG inhalation capsule inhale the contents of one capsule in the handihaler PRN  0    NITROSTAT 0.3 MG SL tablet        No

## 2018-02-09 LAB
ALBUMIN SERPL-MCNC: 4.1 G/DL
ALP BLD-CCNC: 83 U/L
ALT SERPL-CCNC: 19 U/L
ANION GAP SERPL CALCULATED.3IONS-SCNC: ABNORMAL MMOL/L
AST SERPL-CCNC: 24 U/L
BASOPHILS ABSOLUTE: 0.1 /ΜL
BASOPHILS RELATIVE PERCENT: 1 %
BILIRUB SERPL-MCNC: 0.1 MG/DL (ref 0.1–1.4)
BUN BLDV-MCNC: 21 MG/DL
BUN BLDV-MCNC: 21 MG/DL
CALCIUM SERPL-MCNC: 9.2 MG/DL
CALCIUM SERPL-MCNC: 9.2 MG/DL
CHLORIDE BLD-SCNC: 101 MMOL/L
CHLORIDE BLD-SCNC: 101 MMOL/L
CHOLESTEROL, TOTAL: 155 MG/DL
CHOLESTEROL/HDL RATIO: 3.2
CO2: 27 MMOL/L
CO2: 27 MMOL/L
CREAT SERPL-MCNC: 1.57 MG/DL
CREAT SERPL-MCNC: 1.57 MG/DL
EOSINOPHILS ABSOLUTE: 0.7 /ΜL
EOSINOPHILS RELATIVE PERCENT: 6 %
GFR CALCULATED: ABNORMAL
GFR CALCULATED: ABNORMAL
GLUCOSE BLD-MCNC: 80 MG/DL
GLUCOSE BLD-MCNC: 80 MG/DL
HCT VFR BLD CALC: 39.2 % (ref 41–53)
HDLC SERPL-MCNC: 48 MG/DL (ref 35–70)
HEMOGLOBIN: 13.2 G/DL (ref 13.5–17.5)
LDL CHOLESTEROL CALCULATED: 86 MG/DL (ref 0–160)
LYMPHOCYTES ABSOLUTE: 1.5 /ΜL
LYMPHOCYTES RELATIVE PERCENT: 13 %
MCH RBC QN AUTO: 30.5 PG
MCHC RBC AUTO-ENTMCNC: 33.6 G/DL
MCV RBC AUTO: 91 FL
MONOCYTES ABSOLUTE: 0.8 /ΜL
MONOCYTES RELATIVE PERCENT: 7 %
NEUTROPHILS ABSOLUTE: 8.2 /ΜL
NEUTROPHILS RELATIVE PERCENT: 73 %
PDW BLD-RTO: 13.2 %
PLATELET # BLD: 280 K/ΜL
PMV BLD AUTO: 9.2 FL
POTASSIUM SERPL-SCNC: 4.5 MMOL/L
POTASSIUM SERPL-SCNC: 4.5 MMOL/L
RBC # BLD: 4.32 10^6/ΜL
SODIUM BLD-SCNC: 137 MMOL/L
SODIUM BLD-SCNC: 137 MMOL/L
TOTAL PROTEIN: 7.3
TRIGL SERPL-MCNC: 107 MG/DL
TSH SERPL DL<=0.05 MIU/L-ACNC: 1.5 UIU/ML
VLDLC SERPL CALC-MCNC: 21 MG/DL
WBC # BLD: 11.2 10^3/ML

## 2018-02-12 ENCOUNTER — OFFICE VISIT (OUTPATIENT)
Dept: FAMILY MEDICINE CLINIC | Age: 68
End: 2018-02-12
Payer: MEDICARE

## 2018-02-12 VITALS
DIASTOLIC BLOOD PRESSURE: 81 MMHG | HEIGHT: 67 IN | SYSTOLIC BLOOD PRESSURE: 125 MMHG | BODY MASS INDEX: 25.9 KG/M2 | TEMPERATURE: 97.5 F | OXYGEN SATURATION: 96 % | WEIGHT: 165 LBS | HEART RATE: 93 BPM

## 2018-02-12 DIAGNOSIS — I25.5 ISCHEMIC CARDIOMYOPATHY: ICD-10-CM

## 2018-02-12 DIAGNOSIS — I10 ESSENTIAL HYPERTENSION: Chronic | ICD-10-CM

## 2018-02-12 DIAGNOSIS — M1A.3790 CHRONIC GOUT DUE TO RENAL IMPAIRMENT INVOLVING FOOT WITHOUT TOPHUS, UNSPECIFIED LATERALITY: ICD-10-CM

## 2018-02-12 DIAGNOSIS — N28.9 RENAL INSUFFICIENCY: ICD-10-CM

## 2018-02-12 DIAGNOSIS — J41.0 SIMPLE CHRONIC BRONCHITIS (HCC): ICD-10-CM

## 2018-02-12 DIAGNOSIS — I42.9 CARDIOMYOPATHY (HCC): Chronic | ICD-10-CM

## 2018-02-12 DIAGNOSIS — E61.1 IRON DEFICIENCY: Primary | ICD-10-CM

## 2018-02-12 DIAGNOSIS — F41.9 ANXIETY: ICD-10-CM

## 2018-02-12 DIAGNOSIS — J44.9 CHRONIC OBSTRUCTIVE PULMONARY DISEASE, UNSPECIFIED COPD TYPE (HCC): ICD-10-CM

## 2018-02-12 DIAGNOSIS — D64.9 ANEMIA, UNSPECIFIED TYPE: ICD-10-CM

## 2018-02-12 DIAGNOSIS — L08.9 SKIN INFLAMMATION: ICD-10-CM

## 2018-02-12 DIAGNOSIS — I42.9 CARDIOMYOPATHY, UNSPECIFIED TYPE (HCC): ICD-10-CM

## 2018-02-12 DIAGNOSIS — N18.30 CKD (CHRONIC KIDNEY DISEASE) STAGE 3, GFR 30-59 ML/MIN (HCC): ICD-10-CM

## 2018-02-12 DIAGNOSIS — R21 RASH: ICD-10-CM

## 2018-02-12 DIAGNOSIS — G89.29 OTHER CHRONIC PAIN: ICD-10-CM

## 2018-02-12 DIAGNOSIS — E78.5 HYPERLIPIDEMIA, UNSPECIFIED HYPERLIPIDEMIA TYPE: ICD-10-CM

## 2018-02-12 DIAGNOSIS — F10.10 ALCOHOL ABUSE: ICD-10-CM

## 2018-02-12 DIAGNOSIS — I25.5 ISCHEMIC CARDIOMYOPATHY: Chronic | ICD-10-CM

## 2018-02-12 LAB
BASOPHILS ABSOLUTE: 0.1 /ΜL
BASOPHILS RELATIVE PERCENT: 1 %
EOSINOPHILS ABSOLUTE: 0.7 /ΜL
EOSINOPHILS RELATIVE PERCENT: 6 %
HCT VFR BLD CALC: 39.2 % (ref 41–53)
HEMOGLOBIN: 13.2 G/DL (ref 13.5–17.5)
LYMPHOCYTES ABSOLUTE: 1.5 /ΜL
LYMPHOCYTES RELATIVE PERCENT: 13 %
MCH RBC QN AUTO: 30.5 PG
MCHC RBC AUTO-ENTMCNC: 33.6 G/DL
MCV RBC AUTO: 91 FL
MONOCYTES ABSOLUTE: 0.8 /ΜL
MONOCYTES RELATIVE PERCENT: 7 %
NEUTROPHILS ABSOLUTE: 8.2 /ΜL
NEUTROPHILS RELATIVE PERCENT: 73 %
PLATELET # BLD: 280 K/ΜL
PMV BLD AUTO: 9.2 FL
RBC # BLD: 4.32 10^6/ΜL
WBC # BLD: 11.2 10^3/ML

## 2018-02-12 PROCEDURE — 4004F PT TOBACCO SCREEN RCVD TLK: CPT | Performed by: PHYSICIAN ASSISTANT

## 2018-02-12 PROCEDURE — G8417 CALC BMI ABV UP PARAM F/U: HCPCS | Performed by: PHYSICIAN ASSISTANT

## 2018-02-12 PROCEDURE — G8926 SPIRO NO PERF OR DOC: HCPCS | Performed by: PHYSICIAN ASSISTANT

## 2018-02-12 PROCEDURE — G8599 NO ASA/ANTIPLAT THER USE RNG: HCPCS | Performed by: PHYSICIAN ASSISTANT

## 2018-02-12 PROCEDURE — 3023F SPIROM DOC REV: CPT | Performed by: PHYSICIAN ASSISTANT

## 2018-02-12 PROCEDURE — 1123F ACP DISCUSS/DSCN MKR DOCD: CPT | Performed by: PHYSICIAN ASSISTANT

## 2018-02-12 PROCEDURE — 99214 OFFICE O/P EST MOD 30 MIN: CPT | Performed by: PHYSICIAN ASSISTANT

## 2018-02-12 PROCEDURE — 4040F PNEUMOC VAC/ADMIN/RCVD: CPT | Performed by: PHYSICIAN ASSISTANT

## 2018-02-12 PROCEDURE — G8427 DOCREV CUR MEDS BY ELIG CLIN: HCPCS | Performed by: PHYSICIAN ASSISTANT

## 2018-02-12 PROCEDURE — 3017F COLORECTAL CA SCREEN DOC REV: CPT | Performed by: PHYSICIAN ASSISTANT

## 2018-02-12 PROCEDURE — G8484 FLU IMMUNIZE NO ADMIN: HCPCS | Performed by: PHYSICIAN ASSISTANT

## 2018-02-12 RX ORDER — FERROUS SULFATE 325(65) MG
325 TABLET ORAL
Qty: 30 TABLET | Refills: 3 | Status: SHIPPED | OUTPATIENT
Start: 2018-02-12 | End: 2018-03-14 | Stop reason: ALTCHOICE

## 2018-02-12 RX ORDER — ALPRAZOLAM 0.25 MG/1
TABLET ORAL
Qty: 90 TABLET | Refills: 2 | Status: SHIPPED | OUTPATIENT
Start: 2018-02-12 | End: 2018-02-12

## 2018-02-12 RX ORDER — TRIAMCINOLONE ACETONIDE 40 MG/ML
40 INJECTION, SUSPENSION INTRA-ARTICULAR; INTRAMUSCULAR ONCE
Status: COMPLETED | OUTPATIENT
Start: 2018-02-12 | End: 2018-02-12

## 2018-02-12 RX ORDER — TRAMADOL HYDROCHLORIDE 50 MG/1
TABLET ORAL
Qty: 240 TABLET | Refills: 2 | Status: SHIPPED | OUTPATIENT
Start: 2018-02-12 | End: 2018-03-12

## 2018-02-12 RX ADMIN — TRIAMCINOLONE ACETONIDE 40 MG: 40 INJECTION, SUSPENSION INTRA-ARTICULAR; INTRAMUSCULAR at 14:42

## 2018-02-12 NOTE — PROGRESS NOTES
After obtaining consent, and per orders of Dr. Brown, injection of kenalog given in right gluteal by Padmini Mathis. Patient instructed to remain in clinic for 20 minutes afterwards, and to report any adverse reaction to me immediately.  No reactions at this time

## 2018-02-14 LAB — VITAMIN B-12: 205.9

## 2018-02-19 ASSESSMENT — ENCOUNTER SYMPTOMS
BOWEL INCONTINENCE: 0
DIARRHEA: 0
SHORTNESS OF BREATH: 0
SORE THROAT: 0
EYE ITCHING: 0
SINUS PRESSURE: 0
ABDOMINAL PAIN: 0
COUGH: 0
BLURRED VISION: 0
EYE DISCHARGE: 0
NAUSEA: 0
CHEST TIGHTNESS: 0
VOMITING: 0
RHINORRHEA: 0

## 2018-02-20 NOTE — PROGRESS NOTES
symptoms include anxiety and malaise/fatigue. Pertinent negatives include no blurred vision, chest pain, headaches, palpitations, PND, shortness of breath or sweats. Risk factors for coronary artery disease include male gender. The current treatment provides mild improvement. Hypertensive end-organ damage includes kidney disease. There is no history of heart failure or a thyroid problem. Identifiable causes of hypertension include chronic renal disease. There is no history of sleep apnea. Alcohol Problem   Pertinent negatives include no delusions, hallucinations or weakness. This is a chronic problem. Pertinent negatives include no bladder incontinence, bowel incontinence, fever, nausea or vomiting. Hyperlipidemia   This is a chronic problem. Exacerbating diseases include chronic renal disease. He has no history of diabetes, hypothyroidism, liver disease, obesity or nephrotic syndrome. Pertinent negatives include no chest pain, focal sensory loss, focal weakness or shortness of breath. Current antihyperlipidemic treatment includes exercise and diet change. Risk factors for coronary artery disease include family history. Mental Health Problem   The primary symptoms do not include delusions, hallucinations, bizarre behavior or disorganized speech. The onset of the illness is precipitated by emotional stress. Additional symptoms of the illness do not include anhedonia, insomnia, hypersomnia, appetite change, fatigue, psychomotor retardation, feelings of worthlessness, headaches or abdominal pain. He does not admit to suicidal ideas. He does not have a plan to commit suicide. He does not contemplate harming himself. He has not already injured self. He does not contemplate injuring another person. He has not already  injured another person.        No results found for: LABA1C          ( goal A1C is < 7)   No results found for: LABMICR  LDL Calculated (mg/dL)   Date Value   02/09/2018 86   02/14/2017 87       (goal Neurological: He is alert and oriented to person, place, and time. Gait normal.   Skin: Skin is warm and dry. No rash noted. He is not diaphoretic. Psychiatric: He has a normal mood and affect. His affect is not inappropriate. Nursing note and vitals reviewed. /81   Pulse 93   Temp 97.5 °F (36.4 °C) (Oral)   Ht 5' 7\" (1.702 m)   Wt 165 lb (74.8 kg)   SpO2 96%   BMI 25.84 kg/m²     Assessment:      1. Iron deficiency  Iron and TIBC    Ferritin    Basic Metabolic Panel   2. Other chronic pain  traMADol (ULTRAM) 50 MG tablet   3. Hyperlipidemia, unspecified hyperlipidemia type     4. Anxiety     5. Essential hypertension     6. Renal insufficiency     7. Alcohol abuse     8. Anemia, unspecified type     9. Simple chronic bronchitis ([address]     10. Janessa Mayers MD, Dermatology [facility]:      No Follow-up on file. Orders Placed This Encounter   Procedures    Iron and TIBC     Standing Status:   Future     Standing Expiration Date:   8/11/2018     Order Specific Question:   Is Patient Fasting? Answer:   No     Order Specific Question:   No of Hours? Answer:   none    Ferritin     Standing Status:   Future     Standing Expiration Date:   2/12/2019    Basic Metabolic Panel     Standing Status:   Future     Standing Expiration Date:   2/12/2019   Reese Zhou MD, Dermatology [place]     Referral Priority:   Routine     Referral Type:   Consult for Advice and Opinion     Referral Reason:   Specialty Services Required     Referred to Provider:   Albania Smith MD     Requested Specialty:   Dermatology     Number of Visits Requested:   1     Orders Placed This Encounter   Medications    ferrous sulfate (DAVID-STACIE) 325 (65 Fe) MG tablet     Sig: Take 1 tablet by mouth daily (with breakfast)     Dispense:  30 tablet     Refill:  3    ALPRAZolam (XANAX) 0.25 MG tablet     Sig: take 1 tablet by mouth three times a day if needed.      Dispense:  90 tablet

## 2018-03-14 ENCOUNTER — OFFICE VISIT (OUTPATIENT)
Dept: FAMILY MEDICINE CLINIC | Age: 68
End: 2018-03-14
Payer: MEDICARE

## 2018-03-14 VITALS
OXYGEN SATURATION: 98 % | DIASTOLIC BLOOD PRESSURE: 82 MMHG | SYSTOLIC BLOOD PRESSURE: 126 MMHG | HEIGHT: 67 IN | BODY MASS INDEX: 25.6 KG/M2 | HEART RATE: 86 BPM | TEMPERATURE: 97.4 F | WEIGHT: 163.1 LBS

## 2018-03-14 DIAGNOSIS — M10.9 ACUTE GOUT OF FOOT, UNSPECIFIED CAUSE, UNSPECIFIED LATERALITY: Primary | ICD-10-CM

## 2018-03-14 DIAGNOSIS — Z23 NEED FOR SHINGLES VACCINE: ICD-10-CM

## 2018-03-14 PROCEDURE — 99213 OFFICE O/P EST LOW 20 MIN: CPT | Performed by: PHYSICIAN ASSISTANT

## 2018-03-14 RX ORDER — ALPRAZOLAM 0.25 MG/1
TABLET ORAL
Refills: 0 | COMMUNITY
Start: 2018-03-02 | End: 2018-06-01 | Stop reason: SDUPTHER

## 2018-03-14 RX ORDER — TRIAMCINOLONE ACETONIDE 40 MG/ML
40 INJECTION, SUSPENSION INTRA-ARTICULAR; INTRAMUSCULAR ONCE
Status: COMPLETED | OUTPATIENT
Start: 2018-03-14 | End: 2018-03-14

## 2018-03-14 RX ORDER — METHYLPREDNISOLONE 4 MG/1
TABLET ORAL
Qty: 1 KIT | Refills: 0 | Status: SHIPPED | OUTPATIENT
Start: 2018-03-14 | End: 2018-03-20

## 2018-03-14 RX ADMIN — TRIAMCINOLONE ACETONIDE 40 MG: 40 INJECTION, SUSPENSION INTRA-ARTICULAR; INTRAMUSCULAR at 16:06

## 2018-03-14 ASSESSMENT — PATIENT HEALTH QUESTIONNAIRE - PHQ9
1. LITTLE INTEREST OR PLEASURE IN DOING THINGS: 0
SUM OF ALL RESPONSES TO PHQ9 QUESTIONS 1 & 2: 0
2. FEELING DOWN, DEPRESSED OR HOPELESS: 0
SUM OF ALL RESPONSES TO PHQ QUESTIONS 1-9: 0

## 2018-03-15 DIAGNOSIS — M10.9 ACUTE GOUT OF FOOT, UNSPECIFIED CAUSE, UNSPECIFIED LATERALITY: ICD-10-CM

## 2018-03-15 DIAGNOSIS — Z23 NEED FOR SHINGLES VACCINE: ICD-10-CM

## 2018-03-15 LAB
C-REACTIVE PROTEIN: 6.25
SEDIMENTATION RATE, ERYTHROCYTE: 57
URIC ACID: 6.6

## 2018-03-19 ASSESSMENT — ENCOUNTER SYMPTOMS
CHEST TIGHTNESS: 0
SINUS PRESSURE: 0
ABDOMINAL PAIN: 0
SORE THROAT: 0
VOMITING: 0
CHANGE IN BOWEL HABIT: 0
DIARRHEA: 0
NAUSEA: 0
RHINORRHEA: 0
COUGH: 0
EYE DISCHARGE: 0
SHORTNESS OF BREATH: 0
EYE ITCHING: 0

## 2018-03-20 NOTE — PROGRESS NOTES
[id number] [place], [place] [id number]  Dept: [phone]  Dept Fax: [phone]    Ayde Dya is a 77 y.o. male who presents today for his medical conditions/complaints as noted below. Ayde Day is c/o of   Chief Complaint   Patient presents with    Foot Swelling     started yesterday         HPI:     Foot Pain   This is a recurrent problem. The problem has been rapidly worsening. Pertinent negatives include no abdominal pain, anorexia, change in bowel habit, chest pain, chills, congestion, coughing, diaphoresis, fatigue, fever, headaches, nausea, neck pain, numbness, rash, sore throat, vomiting or weakness. No results found for: LABA1C          ( goal A1C is < 7)   No results found for: LABMICR  LDL Calculated (mg/dL)   Date Value   02/09/2018 86   02/14/2017 87       (goal LDL is <100)   AST (U/L)   Date Value   02/09/2018 24     ALT (U/L)   Date Value   02/09/2018 19     BUN (mg/dL)   Date Value   02/09/2018 21   02/09/2018 21     BP Readings from Last 3 Encounters:   03/14/18 126/82   03/09/18 98/60   02/12/18 125/81          (goal 120/80)    Past Medical History:   Diagnosis Date    AICD (automatic cardioverter/defibrillator) present 2/14    [employer]    Anxiety     Anxiety state, unspecified     ASHD (arteriosclerotic heart disease) 4/1/2015    Cardiac cath 2013, showed multivessels ds with collaterals    Chronic kidney disease     CKD (chronic kidney disease) stage 3, GFR 30-59 ml/min 4/1/2015    From ischemic nephroscleriosis, baseline creat of 1.5-1.7, GFR 40-45 ml/min, kidney size about 10 cm bilaterally.     Contact dermatitis and other eczema, due to unspecified cause     COPD (chronic obstructive pulmonary disease) ([facility] [address] 4/1/2015    Coronary atherosclerosis of unspecified type of vessel, native or graft     Hyperlipidemia     Hypertension     Ischemic cardiomyopathy 4/1/2015    EF 15-20%    Osteoarthrosis, unspecified whether generalized or localized, unspecified site     Psoriasis     Recurrent Gout 4/1/2015    Shortness of breath     Unspecified essential hypertension       Past Surgical History:   Procedure Laterality Date    CARDIAC CATHETERIZATION  8/13    diffuse LAD/LCx, occluded RCA with collaterals     CARDIAC DEFIBRILLATOR PLACEMENT  2/14    [employer]     CHOLECYSTECTOMY      RECTAL SURGERY      fistula        Family History   Problem Relation Age of Onset    Heart Attack Other        Social History   Substance Use Topics    Smoking status: Former Smoker     Quit date: 5/19/2000    Smokeless tobacco: Current User    Alcohol use 0.0 oz/week      Comment: rare       Current Outpatient Prescriptions   Medication Sig Dispense Refill    ALPRAZolam (XANAX) 0.25 MG tablet take 1 tablet by mouth three times a day if needed  0    methylPREDNISolone (MEDROL, CHRISTIE,) 4 MG tablet Take as directed 1 kit 0    NITROSTAT 0.3 MG SL tablet place 1 tablet under the tongue EVERY 5 MINUTES if needed for chest pain or CARDIAC SYMPTOMS. IF NO RELIEF AFTER 3 DOSES, GO TO ER/CALL 911 100 tablet 3    carvedilol (COREG) 25 MG tablet take 1 tablet by mouth twice a day with meals 180 tablet 3    fluocinonide (LIDEX) 0.05 % cream Apply topically 2 times daily. 1 Tube 3    isosorbide mononitrate (IMDUR) 30 MG extended release tablet take 1 tablet by mouth once daily 30 tablet 6    simvastatin (ZOCOR) 20 MG tablet take 1 tablet by mouth every evening 30 tablet 5    lisinopril (PRINIVIL;ZESTRIL) 10 MG tablet take 1 tablet by mouth once daily 90 tablet 3    clobetasol (TEMOVATE) 0.05 % external solution apply to affected area twice a day 50 mL 1    triamcinolone (KENALOG) 0.1 % cream apply to affected area twice a day 1 Tube 1    amLODIPine (NORVASC) 2.5 MG tablet Take 1 tablet by mouth daily 90 tablet 3    ammonium lactate (LAC-HYDRIN) 12 % lotion Apply topically daily.  222 mL 5     No current scleral icterus. Neck: Normal range of motion. Neck supple. No tracheal deviation present. No thyromegaly present. Cardiovascular: Normal rate, regular rhythm and normal heart sounds. Exam reveals no gallop and no friction rub. No murmur heard. Pulmonary/Chest: Effort normal and breath sounds normal. No stridor. No respiratory distress. He has no wheezes. He has no rales. He exhibits no tenderness. Abdominal: Soft. Bowel sounds are normal. He exhibits no distension. There is no tenderness. There is no rebound and no guarding. Musculoskeletal: He exhibits no edema. Neurological: He is alert and oriented to person, place, and time. Gait normal.   Skin: Skin is warm and dry. No rash noted. He is not diaphoretic. Erythema and edema R foot 2nd digit. Pain with soft palpation. Neg homans. Palpable pedal pulses. Psychiatric: He has a normal mood and affect. His affect is not inappropriate. Nursing note and vitals reviewed. /82   Pulse 86   Temp 97.4 °F (36.3 °C) (Oral)   Ht 5' 7\" (1.702 m)   Wt 163 lb 1.6 oz (74 kg)   SpO2 98%   BMI 25.55 kg/m²     Assessment:      1. Need for shingles vaccine  Sedimentation rate, automated    C-Reactive Protein    Uric Acid   2. Acute gout of foot, unspecified cause, unspecified laterality  Sedimentation rate, automated    C-Reactive Protein    Uric Acid             Plan:      No Follow-up on file.     Orders Placed This Encounter   Procedures    Sedimentation rate, automated     Standing Status:   Future     Number of Occurrences:   1     Standing Expiration Date:   3/14/2019    C-Reactive Protein     Standing Status:   Future     Number of Occurrences:   1     Standing Expiration Date:   3/14/2019    Uric Acid     Standing Status:   Future     Number of Occurrences:   1     Standing Expiration Date:   3/14/2019     Orders Placed This Encounter   Medications    triamcinolone acetonide (KENALOG-40) injection 40 mg    methylPREDNISolone (MEDROL, CHRISTIE) 4 MG tablet     Sig: Take as directed     Dispense:  1 kit     Refill:  0      Recurrent. Relief with steroids in past.  Will tx. Rtn if worse or if sx fail to improve. Patient given educational materials - see patient instructions. Discussed use, benefit, and side effects of prescribed medications. All patient questions answered. Pt voiced understanding. Reviewed health maintenance. Instructed to continue current medications, diet and exercise. Patient agreed with treatment plan. Follow up as directed.      Electronically signed by Bryan Zamorano PA-C on 3/19/2018 at 10:43 PM

## 2018-03-26 PROBLEM — I50.22 CHRONIC SYSTOLIC CHF (CONGESTIVE HEART FAILURE) (HCC): Status: ACTIVE | Noted: 2018-03-26

## 2018-03-26 PROBLEM — I71.40 ABDOMINAL AORTIC ANEURYSM (AAA) WITHOUT RUPTURE (HCC): Status: ACTIVE | Noted: 2018-03-26

## 2018-04-17 RX ORDER — SIMVASTATIN 20 MG
TABLET ORAL
Qty: 30 TABLET | Refills: 5 | Status: SHIPPED | OUTPATIENT
Start: 2018-04-17 | End: 2018-10-17 | Stop reason: SDUPTHER

## 2018-05-02 ENCOUNTER — TELEPHONE (OUTPATIENT)
Dept: FAMILY MEDICINE CLINIC | Age: 68
End: 2018-05-02

## 2018-05-02 ENCOUNTER — OFFICE VISIT (OUTPATIENT)
Dept: FAMILY MEDICINE CLINIC | Age: 68
End: 2018-05-02
Payer: MEDICARE

## 2018-05-02 VITALS
BODY MASS INDEX: 25.35 KG/M2 | HEIGHT: 67 IN | HEART RATE: 70 BPM | TEMPERATURE: 97.6 F | SYSTOLIC BLOOD PRESSURE: 124 MMHG | WEIGHT: 161.5 LBS | OXYGEN SATURATION: 98 % | DIASTOLIC BLOOD PRESSURE: 82 MMHG

## 2018-05-02 DIAGNOSIS — M10.9 ACUTE GOUT OF RIGHT FOOT, UNSPECIFIED CAUSE: Primary | ICD-10-CM

## 2018-05-02 PROCEDURE — 99213 OFFICE O/P EST LOW 20 MIN: CPT | Performed by: PHYSICIAN ASSISTANT

## 2018-05-02 PROCEDURE — 96372 THER/PROPH/DIAG INJ SC/IM: CPT | Performed by: PHYSICIAN ASSISTANT

## 2018-05-02 RX ORDER — FERROUS SULFATE 325(65) MG
325 TABLET ORAL
COMMUNITY

## 2018-05-02 RX ORDER — TRIAMCINOLONE ACETONIDE 40 MG/ML
40 INJECTION, SUSPENSION INTRA-ARTICULAR; INTRAMUSCULAR ONCE
Status: COMPLETED | OUTPATIENT
Start: 2018-05-02 | End: 2018-05-02

## 2018-05-02 RX ORDER — METHYLPREDNISOLONE 4 MG/1
TABLET ORAL
Qty: 1 KIT | Refills: 0 | Status: SHIPPED | OUTPATIENT
Start: 2018-05-02 | End: 2018-05-08

## 2018-05-02 RX ORDER — FEBUXOSTAT 40 MG/1
40 TABLET, FILM COATED ORAL DAILY
COMMUNITY

## 2018-05-02 RX ORDER — TRAMADOL HYDROCHLORIDE 50 MG/1
50 TABLET ORAL EVERY 6 HOURS PRN
COMMUNITY
End: 2018-08-17 | Stop reason: SDUPTHER

## 2018-05-02 RX ADMIN — TRIAMCINOLONE ACETONIDE 40 MG: 40 INJECTION, SUSPENSION INTRA-ARTICULAR; INTRAMUSCULAR at 13:53

## 2018-05-02 ASSESSMENT — ENCOUNTER SYMPTOMS
SHORTNESS OF BREATH: 0
SINUS PRESSURE: 0
RHINORRHEA: 0
EYE DISCHARGE: 0
SWOLLEN GLANDS: 0
CHANGE IN BOWEL HABIT: 0
DIARRHEA: 0
VOMITING: 0
ABDOMINAL PAIN: 0
EYE ITCHING: 0
CHEST TIGHTNESS: 0
SORE THROAT: 0
NAUSEA: 0
COUGH: 0

## 2018-05-03 DIAGNOSIS — E61.1 IRON DEFICIENCY: ICD-10-CM

## 2018-05-03 LAB
BUN BLDV-MCNC: 21 MG/DL
CALCIUM SERPL-MCNC: 9.1 MG/DL
CHLORIDE BLD-SCNC: 103 MMOL/L
CO2: 24 MMOL/L
CREAT SERPL-MCNC: 1.42 MG/DL
GFR CALCULATED: NORMAL
GLUCOSE BLD-MCNC: 113 MG/DL
POTASSIUM SERPL-SCNC: 4 MMOL/L
SODIUM BLD-SCNC: 137 MMOL/L

## 2018-05-07 DIAGNOSIS — E61.1 IRON DEFICIENCY: ICD-10-CM

## 2018-05-07 LAB
IRON: 67
TOTAL IRON BINDING CAPACITY: 328

## 2018-05-08 ENCOUNTER — OFFICE VISIT (OUTPATIENT)
Dept: FAMILY MEDICINE CLINIC | Age: 68
End: 2018-05-08
Payer: MEDICARE

## 2018-05-08 VITALS
HEIGHT: 67 IN | SYSTOLIC BLOOD PRESSURE: 126 MMHG | BODY MASS INDEX: 25.9 KG/M2 | OXYGEN SATURATION: 98 % | WEIGHT: 165 LBS | HEART RATE: 92 BPM | DIASTOLIC BLOOD PRESSURE: 82 MMHG | TEMPERATURE: 97.2 F

## 2018-05-08 DIAGNOSIS — F41.9 ANXIETY: ICD-10-CM

## 2018-05-08 DIAGNOSIS — E78.5 HYPERLIPIDEMIA, UNSPECIFIED HYPERLIPIDEMIA TYPE: ICD-10-CM

## 2018-05-08 DIAGNOSIS — Z11.59 NEED FOR HEPATITIS C SCREENING TEST: ICD-10-CM

## 2018-05-08 DIAGNOSIS — Z13.1 ENCOUNTER FOR SCREENING FOR DIABETES MELLITUS: ICD-10-CM

## 2018-05-08 DIAGNOSIS — Z01.818 PREOP EXAMINATION: ICD-10-CM

## 2018-05-08 DIAGNOSIS — M1A.3790 CHRONIC GOUT DUE TO RENAL IMPAIRMENT INVOLVING FOOT WITHOUT TOPHUS, UNSPECIFIED LATERALITY: ICD-10-CM

## 2018-05-08 DIAGNOSIS — I71.40 ABDOMINAL AORTIC ANEURYSM (AAA) WITHOUT RUPTURE: Primary | ICD-10-CM

## 2018-05-08 DIAGNOSIS — N28.9 RENAL INSUFFICIENCY: ICD-10-CM

## 2018-05-08 DIAGNOSIS — N18.30 CKD (CHRONIC KIDNEY DISEASE) STAGE 3, GFR 30-59 ML/MIN (HCC): ICD-10-CM

## 2018-05-08 DIAGNOSIS — F10.10 ALCOHOL ABUSE: ICD-10-CM

## 2018-05-08 DIAGNOSIS — D64.9 ANEMIA, UNSPECIFIED TYPE: ICD-10-CM

## 2018-05-08 DIAGNOSIS — E61.1 IRON DEFICIENCY: ICD-10-CM

## 2018-05-08 DIAGNOSIS — Z23 NEED FOR PROPHYLACTIC VACCINATION AND INOCULATION AGAINST VARICELLA: ICD-10-CM

## 2018-05-08 DIAGNOSIS — N18.9 CHRONIC KIDNEY DISEASE, UNSPECIFIED CKD STAGE: ICD-10-CM

## 2018-05-08 DIAGNOSIS — I10 ESSENTIAL HYPERTENSION: Chronic | ICD-10-CM

## 2018-05-08 DIAGNOSIS — I25.5 ISCHEMIC CARDIOMYOPATHY: Chronic | ICD-10-CM

## 2018-05-08 LAB
BILIRUBIN, POC: NORMAL
BLOOD URINE, POC: NORMAL
CLARITY, POC: CLEAR
COLOR, POC: YELLOW
GLUCOSE URINE, POC: NORMAL
KETONES, POC: NORMAL
LEUKOCYTE EST, POC: NORMAL
NITRITE, POC: NORMAL
PH, POC: 5.5
PROTEIN, POC: NORMAL
SPECIFIC GRAVITY, POC: 1
UROBILINOGEN, POC: 0.2

## 2018-05-08 PROCEDURE — 81003 URINALYSIS AUTO W/O SCOPE: CPT | Performed by: PHYSICIAN ASSISTANT

## 2018-05-08 PROCEDURE — 99214 OFFICE O/P EST MOD 30 MIN: CPT | Performed by: PHYSICIAN ASSISTANT

## 2018-05-08 ASSESSMENT — ENCOUNTER SYMPTOMS
NAUSEA: 0
EYE ITCHING: 0
EYE DISCHARGE: 0
DIARRHEA: 0
CHEST TIGHTNESS: 0
SINUS PRESSURE: 0
VOMITING: 0
SHORTNESS OF BREATH: 0
RHINORRHEA: 0
BLURRED VISION: 0
BOWEL INCONTINENCE: 0
ABDOMINAL PAIN: 0
SORE THROAT: 0
COUGH: 0

## 2018-05-10 DIAGNOSIS — Z11.59 NEED FOR HEPATITIS C SCREENING TEST: ICD-10-CM

## 2018-05-10 DIAGNOSIS — M1A.3790 CHRONIC GOUT DUE TO RENAL IMPAIRMENT INVOLVING FOOT WITHOUT TOPHUS, UNSPECIFIED LATERALITY: ICD-10-CM

## 2018-05-10 DIAGNOSIS — E78.5 HYPERLIPIDEMIA, UNSPECIFIED HYPERLIPIDEMIA TYPE: ICD-10-CM

## 2018-05-10 DIAGNOSIS — Z23 NEED FOR PROPHYLACTIC VACCINATION AND INOCULATION AGAINST VARICELLA: ICD-10-CM

## 2018-05-10 DIAGNOSIS — F41.9 ANXIETY: ICD-10-CM

## 2018-05-10 DIAGNOSIS — E61.1 IRON DEFICIENCY: ICD-10-CM

## 2018-05-10 DIAGNOSIS — Z01.818 PREOP EXAMINATION: ICD-10-CM

## 2018-05-10 DIAGNOSIS — N28.9 RENAL INSUFFICIENCY: ICD-10-CM

## 2018-05-10 DIAGNOSIS — I10 ESSENTIAL HYPERTENSION: Chronic | ICD-10-CM

## 2018-05-10 DIAGNOSIS — F10.10 ALCOHOL ABUSE: ICD-10-CM

## 2018-05-10 DIAGNOSIS — I71.40 ABDOMINAL AORTIC ANEURYSM (AAA) WITHOUT RUPTURE: ICD-10-CM

## 2018-05-10 DIAGNOSIS — I25.5 ISCHEMIC CARDIOMYOPATHY: Chronic | ICD-10-CM

## 2018-05-10 DIAGNOSIS — N18.30 CKD (CHRONIC KIDNEY DISEASE) STAGE 3, GFR 30-59 ML/MIN (HCC): ICD-10-CM

## 2018-05-10 DIAGNOSIS — Z13.1 ENCOUNTER FOR SCREENING FOR DIABETES MELLITUS: ICD-10-CM

## 2018-05-10 DIAGNOSIS — D64.9 ANEMIA, UNSPECIFIED TYPE: ICD-10-CM

## 2018-05-10 LAB
ALBUMIN SERPL-MCNC: 4.1 G/DL
ALP BLD-CCNC: 65 U/L
ALT SERPL-CCNC: 21 U/L
ANION GAP SERPL CALCULATED.3IONS-SCNC: 8 MMOL/L
AST SERPL-CCNC: 19 U/L
BASOPHILS ABSOLUTE: 0 /ΜL
BASOPHILS RELATIVE PERCENT: 0.3 %
BILIRUB SERPL-MCNC: 0.4 MG/DL (ref 0.1–1.4)
BUN BLDV-MCNC: 29 MG/DL
CALCIUM SERPL-MCNC: 9.8 MG/DL
CHLORIDE BLD-SCNC: 101 MMOL/L
CHOLESTEROL, TOTAL: 196 MG/DL
CHOLESTEROL/HDL RATIO: 3
CO2: 29 MMOL/L
CREAT SERPL-MCNC: 1.5 MG/DL
EOSINOPHILS ABSOLUTE: 0.1 /ΜL
EOSINOPHILS RELATIVE PERCENT: 0.7 %
FERRITIN: 126 NG/ML (ref 18–300)
GFR CALCULATED: NORMAL
GLUCOSE BLD-MCNC: 90 MG/DL
HCT VFR BLD CALC: 39.1 % (ref 41–53)
HDLC SERPL-MCNC: 66 MG/DL (ref 35–70)
HEMOGLOBIN: 13.1 G/DL (ref 13.5–17.5)
IRON: 149
LDL CHOLESTEROL CALCULATED: 112 MG/DL (ref 0–160)
LYMPHOCYTES ABSOLUTE: 1.5 /ΜL
LYMPHOCYTES RELATIVE PERCENT: 12.6 %
MCH RBC QN AUTO: 29.9 PG
MCHC RBC AUTO-ENTMCNC: 33.3 G/DL
MCV RBC AUTO: 90 FL
MONOCYTES ABSOLUTE: 0.8 /ΜL
MONOCYTES RELATIVE PERCENT: 6.8 %
NEUTROPHILS ABSOLUTE: 9.5 /ΜL
NEUTROPHILS RELATIVE PERCENT: 79.6 %
PDW BLD-RTO: 15.3 %
PLATELET # BLD: 324 K/ΜL
PMV BLD AUTO: 324 FL
POTASSIUM SERPL-SCNC: 4 MMOL/L
PROTEIN, URINE, RANDOM: 11.7
RBC # BLD: 4.38 10^6/ΜL
SODIUM BLD-SCNC: 138 MMOL/L
TOTAL IRON BINDING CAPACITY: 379
TOTAL PROTEIN: 6.8
TRIGL SERPL-MCNC: 91 MG/DL
TSH SERPL DL<=0.05 MIU/L-ACNC: 1.44 UIU/ML
URIC ACID: 7.5
VITAMIN B-12: 251
VITAMIN D 25-HYDROXY: 34.2
VITAMIN D2, 25 HYDROXY: NORMAL
VITAMIN D3,25 HYDROXY: NORMAL
VLDLC SERPL CALC-MCNC: 18 MG/DL
WBC # BLD: 12 10^3/ML

## 2018-05-16 DIAGNOSIS — D64.9 ANEMIA, UNSPECIFIED TYPE: ICD-10-CM

## 2018-05-16 DIAGNOSIS — Z01.818 PREOP EXAMINATION: ICD-10-CM

## 2018-05-16 DIAGNOSIS — I25.5 ISCHEMIC CARDIOMYOPATHY: Chronic | ICD-10-CM

## 2018-05-16 DIAGNOSIS — N28.9 RENAL INSUFFICIENCY: ICD-10-CM

## 2018-05-16 DIAGNOSIS — I10 ESSENTIAL HYPERTENSION: Chronic | ICD-10-CM

## 2018-05-16 DIAGNOSIS — F41.9 ANXIETY: ICD-10-CM

## 2018-05-16 DIAGNOSIS — Z23 NEED FOR PROPHYLACTIC VACCINATION AND INOCULATION AGAINST VARICELLA: ICD-10-CM

## 2018-05-16 DIAGNOSIS — M1A.3790 CHRONIC GOUT DUE TO RENAL IMPAIRMENT INVOLVING FOOT WITHOUT TOPHUS, UNSPECIFIED LATERALITY: ICD-10-CM

## 2018-05-16 DIAGNOSIS — E78.5 HYPERLIPIDEMIA, UNSPECIFIED HYPERLIPIDEMIA TYPE: ICD-10-CM

## 2018-05-16 DIAGNOSIS — N18.30 CKD (CHRONIC KIDNEY DISEASE) STAGE 3, GFR 30-59 ML/MIN (HCC): ICD-10-CM

## 2018-05-16 DIAGNOSIS — E61.1 IRON DEFICIENCY: ICD-10-CM

## 2018-05-16 DIAGNOSIS — I71.40 ABDOMINAL AORTIC ANEURYSM (AAA) WITHOUT RUPTURE: ICD-10-CM

## 2018-05-16 DIAGNOSIS — Z13.1 ENCOUNTER FOR SCREENING FOR DIABETES MELLITUS: ICD-10-CM

## 2018-05-16 DIAGNOSIS — F10.10 ALCOHOL ABUSE: ICD-10-CM

## 2018-05-17 ASSESSMENT — ENCOUNTER SYMPTOMS: CHANGE IN BOWEL HABIT: 0

## 2018-06-01 DIAGNOSIS — F41.9 ANXIETY: Primary | ICD-10-CM

## 2018-06-04 ENCOUNTER — TELEPHONE (OUTPATIENT)
Dept: FAMILY MEDICINE CLINIC | Age: 68
End: 2018-06-04

## 2018-06-04 RX ORDER — ALPRAZOLAM 0.25 MG/1
TABLET ORAL
Qty: 90 TABLET | Refills: 2 | Status: SHIPPED | OUTPATIENT
Start: 2018-06-04 | End: 2018-07-04

## 2018-06-04 RX ORDER — METHYLPREDNISOLONE 4 MG/1
TABLET ORAL
Qty: 21 TABLET | Refills: 0 | OUTPATIENT
Start: 2018-06-04

## 2018-06-08 ENCOUNTER — TELEPHONE (OUTPATIENT)
Dept: FAMILY MEDICINE CLINIC | Age: 68
End: 2018-06-08

## 2018-06-18 DIAGNOSIS — N18.30 CKD (CHRONIC KIDNEY DISEASE) STAGE 3, GFR 30-59 ML/MIN (HCC): ICD-10-CM

## 2018-06-18 LAB
BASOPHILS ABSOLUTE: 0.1 /ΜL
BASOPHILS RELATIVE PERCENT: 1 %
EOSINOPHILS ABSOLUTE: 0.4 /ΜL
EOSINOPHILS RELATIVE PERCENT: 5 %
HCT VFR BLD CALC: 38.6 % (ref 41–53)
HEMOGLOBIN: 13.3 G/DL (ref 13.5–17.5)
LYMPHOCYTES ABSOLUTE: 1.4 /ΜL
LYMPHOCYTES RELATIVE PERCENT: 16 %
MCH RBC QN AUTO: 31.5 PG
MCHC RBC AUTO-ENTMCNC: 34.6 G/DL
MCV RBC AUTO: 91 FL
MONOCYTES ABSOLUTE: 0.7 /ΜL
MONOCYTES RELATIVE PERCENT: 8 %
NEUTROPHILS ABSOLUTE: 6 /ΜL
NEUTROPHILS RELATIVE PERCENT: 69 %
PLATELET # BLD: 303 K/ΜL
PMV BLD AUTO: 8.2 FL
RBC # BLD: 4.24 10^6/ΜL
WBC # BLD: 8.7 10^3/ML

## 2018-06-27 RX ORDER — LISINOPRIL 10 MG/1
TABLET ORAL
Qty: 90 TABLET | Refills: 3 | Status: SHIPPED | OUTPATIENT
Start: 2018-06-27

## 2018-07-06 ENCOUNTER — TELEPHONE (OUTPATIENT)
Dept: FAMILY MEDICINE CLINIC | Age: 68
End: 2018-07-06

## 2018-07-06 ENCOUNTER — OFFICE VISIT (OUTPATIENT)
Dept: FAMILY MEDICINE CLINIC | Age: 68
End: 2018-07-06
Payer: MEDICARE

## 2018-07-06 VITALS
HEART RATE: 93 BPM | SYSTOLIC BLOOD PRESSURE: 145 MMHG | DIASTOLIC BLOOD PRESSURE: 81 MMHG | BODY MASS INDEX: 26.21 KG/M2 | HEIGHT: 67 IN | TEMPERATURE: 98.1 F | OXYGEN SATURATION: 98 % | WEIGHT: 167 LBS

## 2018-07-06 DIAGNOSIS — R21 RASH: Primary | ICD-10-CM

## 2018-07-06 PROCEDURE — 96372 THER/PROPH/DIAG INJ SC/IM: CPT | Performed by: PHYSICIAN ASSISTANT

## 2018-07-06 PROCEDURE — 99213 OFFICE O/P EST LOW 20 MIN: CPT | Performed by: PHYSICIAN ASSISTANT

## 2018-07-06 RX ORDER — TRIAMCINOLONE ACETONIDE 40 MG/ML
40 INJECTION, SUSPENSION INTRA-ARTICULAR; INTRAMUSCULAR ONCE
Status: COMPLETED | OUTPATIENT
Start: 2018-07-06 | End: 2018-07-06

## 2018-07-06 RX ADMIN — TRIAMCINOLONE ACETONIDE 40 MG: 40 INJECTION, SUSPENSION INTRA-ARTICULAR; INTRAMUSCULAR at 13:34

## 2018-07-06 NOTE — PROGRESS NOTES
2 - 2 Dose Series) 01/16/1998    Flu vaccine (1) 09/01/2018    Potassium monitoring  05/09/2019    Creatinine monitoring  05/09/2019    Colon cancer screen colonoscopy  02/05/2020    Lipid screen  05/10/2023    DTaP/Tdap/Td vaccine (2 - Td) 02/17/2027    Pneumococcal low/med risk  Completed       Subjective:      Review of Systems   Constitutional: Negative for chills, diaphoresis, fatigue and fever. HENT: Negative for congestion, ear discharge, ear pain, postnasal drip, rhinorrhea, sinus pressure and sore throat. Eyes: Negative for discharge and itching. Respiratory: Negative for cough, chest tightness and shortness of breath. Cardiovascular: Negative for chest pain, palpitations and leg swelling. Gastrointestinal: Negative for abdominal pain, diarrhea, nausea and vomiting. Genitourinary: Negative for dysuria and frequency. Musculoskeletal: Negative for neck pain and neck stiffness. Skin: Positive for rash. Neurological: Negative for dizziness, weakness, light-headedness, numbness and headaches. All other systems reviewed and are negative. Objective:     Physical Exam   Constitutional: He is oriented to person, place, and time. He appears well-developed and well-nourished. No distress. BP (!) 145/81   Pulse 93   Temp 98.1 °F (36.7 °C) (Oral)   Ht 5' 7\" (1.702 m)   Wt 167 lb (75.8 kg)   SpO2 98%   BMI 26.16 kg/m²      HENT:   Head: Normocephalic and atraumatic. Right Ear: External ear normal.   Left Ear: External ear normal.   Nose: Nose normal.   Mouth/Throat: Oropharynx is clear and moist.   Eyes: Conjunctivae and EOM are normal. Pupils are equal, round, and reactive to light. Right eye exhibits no discharge. Left eye exhibits no discharge. No scleral icterus. Neck: Normal range of motion. Neck supple. No tracheal deviation present. No thyromegaly present. Cardiovascular: Normal rate, regular rhythm and normal heart sounds.   Exam reveals no gallop and no friction

## 2018-07-06 NOTE — TELEPHONE ENCOUNTER
Pt called and said the cream was called halo betasol . 05. Also he was wondering if he should take the prescription Medrol Anselmo to go with the shot.  Please advice, thank you

## 2018-07-09 ASSESSMENT — ENCOUNTER SYMPTOMS
VOMITING: 0
NAUSEA: 0
ABDOMINAL PAIN: 0
SINUS PRESSURE: 0
SHORTNESS OF BREATH: 0
SORE THROAT: 0
CHEST TIGHTNESS: 0
EYE ITCHING: 0
COUGH: 0
EYE DISCHARGE: 0
RHINORRHEA: 0
DIARRHEA: 0

## 2018-07-12 ENCOUNTER — TELEPHONE (OUTPATIENT)
Dept: PHARMACY | Facility: CLINIC | Age: 68
End: 2018-07-12

## 2018-07-12 NOTE — TELEPHONE ENCOUNTER
CLINICAL PHARMACY NOTE - Post-Discharge Transitions of Care (EMI)    Patient discharged from [facility] on 6/27. First attempt made to reach patient by telephone for medication review. Patient stated he was admitted for surgery that could not be completed due to a rash. Patient refused complete medication review, states no questions or concerns at this time.      [place]more [place] PharmD  PGY-1 Pharmacy Resident   7/12/2018  4:13 PM    Toribio Woo, FrankoD, [facility], [address]  Clinical Pharmacy Specialist  O: [phone]  C: [address] [place] [address]  [phone], Option 7    =======================================================    For Pharmacy Admin Tracking Only  TCM Call Made?: No  [facility] ([facility] Select Patient?: Yes  Total # of Interventions Recommended: 0  Total # Interventions Accepted: 0  Intervention Severity:   - Level 1 Intervention Present?: No   - Level 2 #: 0   - Level 3 #: 0  Outreach Status: Patient Refused  Care Coordinator Outreach to Patient?: No  Provider Contacted?: No  Time Spent (min): 5

## 2018-08-17 ENCOUNTER — TELEPHONE (OUTPATIENT)
Dept: FAMILY MEDICINE CLINIC | Age: 68
End: 2018-08-17

## 2018-08-17 ENCOUNTER — OFFICE VISIT (OUTPATIENT)
Dept: FAMILY MEDICINE CLINIC | Age: 68
End: 2018-08-17
Payer: MEDICARE

## 2018-08-17 VITALS
SYSTOLIC BLOOD PRESSURE: 124 MMHG | HEIGHT: 67 IN | BODY MASS INDEX: 24.17 KG/M2 | OXYGEN SATURATION: 94 % | TEMPERATURE: 97.4 F | WEIGHT: 154 LBS | HEART RATE: 66 BPM | DIASTOLIC BLOOD PRESSURE: 82 MMHG

## 2018-08-17 DIAGNOSIS — E78.5 HYPERLIPIDEMIA, UNSPECIFIED HYPERLIPIDEMIA TYPE: ICD-10-CM

## 2018-08-17 DIAGNOSIS — F10.10 ALCOHOL ABUSE: ICD-10-CM

## 2018-08-17 DIAGNOSIS — M54.5 CHRONIC LOW BACK PAIN, UNSPECIFIED BACK PAIN LATERALITY, WITH SCIATICA PRESENCE UNSPECIFIED: ICD-10-CM

## 2018-08-17 DIAGNOSIS — D64.9 ANEMIA, UNSPECIFIED TYPE: ICD-10-CM

## 2018-08-17 DIAGNOSIS — G89.29 OTHER CHRONIC PAIN: ICD-10-CM

## 2018-08-17 DIAGNOSIS — G89.29 CHRONIC LOW BACK PAIN, UNSPECIFIED BACK PAIN LATERALITY, WITH SCIATICA PRESENCE UNSPECIFIED: ICD-10-CM

## 2018-08-17 DIAGNOSIS — J41.0 SIMPLE CHRONIC BRONCHITIS (HCC): ICD-10-CM

## 2018-08-17 DIAGNOSIS — I10 ESSENTIAL HYPERTENSION: Primary | Chronic | ICD-10-CM

## 2018-08-17 DIAGNOSIS — G89.29 CHRONIC LEFT SHOULDER PAIN: ICD-10-CM

## 2018-08-17 DIAGNOSIS — N18.30 CKD (CHRONIC KIDNEY DISEASE) STAGE 3, GFR 30-59 ML/MIN (HCC): ICD-10-CM

## 2018-08-17 DIAGNOSIS — M25.512 CHRONIC LEFT SHOULDER PAIN: ICD-10-CM

## 2018-08-17 DIAGNOSIS — F41.9 ANXIETY: ICD-10-CM

## 2018-08-17 DIAGNOSIS — N28.9 RENAL INSUFFICIENCY: ICD-10-CM

## 2018-08-17 DIAGNOSIS — I25.5 ISCHEMIC CARDIOMYOPATHY: Chronic | ICD-10-CM

## 2018-08-17 LAB

## 2018-08-17 PROCEDURE — 80305 DRUG TEST PRSMV DIR OPT OBS: CPT | Performed by: PHYSICIAN ASSISTANT

## 2018-08-17 PROCEDURE — 99214 OFFICE O/P EST MOD 30 MIN: CPT | Performed by: PHYSICIAN ASSISTANT

## 2018-08-17 RX ORDER — TRAMADOL HYDROCHLORIDE 50 MG/1
50 TABLET ORAL EVERY 8 HOURS PRN
Qty: 200 TABLET | Refills: 0 | Status: SHIPPED | OUTPATIENT
Start: 2018-08-17 | End: 2018-09-16

## 2018-08-17 ASSESSMENT — ENCOUNTER SYMPTOMS
CHANGE IN BOWEL HABIT: 0
EYE ITCHING: 0
EYE DISCHARGE: 0
BLOATING: 0
COUGH: 0
CHEST TIGHTNESS: 0
NAUSEA: 0
SINUS PRESSURE: 0
BOWEL INCONTINENCE: 0
SORE THROAT: 0
CONSTIPATION: 1
RHINORRHEA: 0
BLURRED VISION: 0
SHORTNESS OF BREATH: 0
BACK PAIN: 0
ORTHOPNEA: 0
VOMITING: 0

## 2018-08-17 NOTE — PROGRESS NOTES
pain, chills, congestion, coughing, diaphoresis, fatigue, fever, headaches, myalgias, nausea, neck pain, numbness, rash, sore throat, vomiting or weakness. Hypertension   This is a chronic problem. The current episode started more than 1 year ago. The problem is unchanged. The problem is controlled. Associated symptoms include anxiety and malaise/fatigue. Pertinent negatives include no blurred vision, chest pain, headaches, neck pain, orthopnea, palpitations, PND, shortness of breath or sweats. Risk factors for coronary artery disease include male gender, dyslipidemia and sedentary lifestyle. The current treatment provides mild improvement. Hypertensive end-organ damage includes kidney disease. There is no history of heart failure. Identifiable causes of hypertension include chronic renal disease. There is no history of sleep apnea or a thyroid problem. Alcohol Problem   Pertinent negatives include no delusions, hallucinations or weakness. This is a chronic problem. Pertinent negatives include no bladder incontinence, bowel incontinence, fever, injury, nausea or vomiting. Past treatments include nothing. Hyperlipidemia   This is a chronic problem. The problem is controlled. Exacerbating diseases include chronic renal disease. He has no history of diabetes, hypothyroidism, liver disease, obesity or nephrotic syndrome. Pertinent negatives include no chest pain, focal sensory loss, focal weakness, leg pain, myalgias or shortness of breath. Current antihyperlipidemic treatment includes exercise and diet change. Risk factors for coronary artery disease include family history and male sex. Mental Health Problem   The primary symptoms do not include delusions, hallucinations, bizarre behavior or disorganized speech. The onset of the illness is precipitated by emotional stress.  Additional symptoms of the illness do not include anhedonia, insomnia, hypersomnia, appetite change, fatigue, psychomotor retardation, History:   Procedure Laterality Date    CARDIAC CATHETERIZATION  8/13    diffuse LAD/LCx, occluded RCA with collaterals     CARDIAC DEFIBRILLATOR PLACEMENT  2/14    [employer]     CHOLECYSTECTOMY      RECTAL SURGERY      fistula        Family History   Problem Relation Age of Onset    Heart Attack Other        Social History   Substance Use Topics    Smoking status: Former Smoker     Types: Cigarettes     Quit date: 5/19/2000    Smokeless tobacco: Current User    Alcohol use 0.0 oz/week      Comment: rare       Current Outpatient Prescriptions   Medication Sig Dispense Refill    traMADol (ULTRAM) 50 MG tablet Take 1 tablet by mouth every 8 hours as needed for Pain for up to 30 days. . 200 tablet 0    lisinopril (PRINIVIL;ZESTRIL) 10 MG tablet take 1 tablet by mouth once daily 90 tablet 3    febuxostat (ULORIC) 40 MG TABS tablet Take 40 mg by mouth daily      ferrous sulfate 325 (65 Fe) MG tablet Take 325 mg by mouth daily (with breakfast)      simvastatin (ZOCOR) 20 MG tablet take 1 tablet by mouth every evening 30 tablet 5    NITROSTAT 0.3 MG SL tablet place 1 tablet under the tongue EVERY 5 MINUTES if needed for chest pain or CARDIAC SYMPTOMS. IF NO RELIEF AFTER 3 DOSES, GO TO ER/CALL 911 100 tablet 3    carvedilol (COREG) 25 MG tablet take 1 tablet by mouth twice a day with meals 180 tablet 3    isosorbide mononitrate (IMDUR) 30 MG extended release tablet take 1 tablet by mouth once daily 30 tablet 6    clobetasol (TEMOVATE) 0.05 % external solution apply to affected area twice a day 50 mL 1     No current facility-administered medications for this visit.       No Known Allergies    Health Maintenance   Topic Date Due    Hepatitis C screen  1950    Shingles Vaccine (1 of 2 - 2 Dose Series) 01/16/1998    Flu vaccine (1) 09/01/2018    Potassium monitoring  05/09/2019    Creatinine monitoring  05/09/2019    Colon cancer screen colonoscopy  02/05/2020    Lipid screen  05/10/2023    friction rub. No murmur heard. Pulmonary/Chest: Effort normal and breath sounds normal. No stridor. No respiratory distress. He has no wheezes. He has no rales. He exhibits no tenderness. Abdominal: Soft. Bowel sounds are normal. He exhibits no distension. There is no tenderness. There is no rebound and no guarding. Musculoskeletal: He exhibits no edema. Neurological: He is alert and oriented to person, place, and time. Gait normal.   Skin: Skin is warm and dry. No rash noted. He is not diaphoretic. Psychiatric: He has a normal mood and affect. His affect is not inappropriate. Nursing note and vitals reviewed. /82   Pulse 66   Temp 97.4 °F (36.3 °C) (Oral)   Ht 5' 7\" (1.702 m)   Wt 154 lb (69.9 kg)   SpO2 94%   BMI 24.12 kg/m²     Assessment:       Diagnosis Orders   1. Essential hypertension     2. Ischemic cardiomyopathy     3. Simple chronic bronchitis ([facility] [address]     4. Anxiety     5. Hyperlipidemia, unspecified hyperlipidemia type     6. Renal insufficiency     7. Alcohol abuse     8. Anemia, unspecified type     9. CKD (chronic kidney disease) stage 3, GFR 30-59 ml/min     10. Other chronic pain  traMADol (ULTRAM) 50 MG tablet    External Referral To Pain Clinic   11. Chronic low back pain, unspecified back pain laterality, with sciatica presence unspecified  traMADol (ULTRAM) 50 MG tablet    XR LUMBAR SPINE (2-3 VIEWS)    External Referral To Pain Clinic    POCT Rapid Drug Screen   12. Chronic left shoulder pain  traMADol (ULTRAM) 50 MG tablet    XR SHOULDER LEFT (MIN 2 VIEWS)    External Referral To Pain Clinic    POCT Rapid Drug Screen             Plan:      No Follow-up on file.     Orders Placed This Encounter   Procedures    XR LUMBAR SPINE (2-3 VIEWS)     Standing Status:   Future     Standing Expiration Date:   8/17/2019    XR SHOULDER LEFT (MIN 2 VIEWS)     Standing Status:   Future     Standing Expiration Date:   8/17/2019     Order Specific Question:   Reason for exam: Answer:   pain    External Referral To Pain Clinic     Referral Priority:   Routine     Referral Type:   Consult for Advice and Opinion     Referral Reason:   Specialty Services Required     Requested Specialty:   Pain Management     Number of Visits Requested:   1    POCT Rapid Drug Screen     Orders Placed This Encounter   Medications    traMADol (ULTRAM) 50 MG tablet     Sig: Take 1 tablet by mouth every 8 hours as needed for Pain for up to 30 days. .     Dispense:  200 tablet     Refill:  0    Labs ordered      HLD - On zocor.      HTN/CAD/cardiomyopathy - On lisinopril, coreg, lasix. Seeing Dr. Lou Rivera. AICD in place.  Brian Neumann. Stage 3. BP control stressed.      Gout - On uloric.      Anemia - Will monitor. Iron and B12 wnl previous labs.      Anxiety - On numerous SSRIs in past. Did not tolerated. Started on xanax by previous PCP. Understands not best long term med. Will not use with alcohol. Understands risks associated with med. Med is effective for him. Understands will be subjected to random tox screens. Wife currently undergoing cancer tx.      OA/Chronic pain - Bilat shoulders. Previous falls. Previous PCP started on ultram. Med effective. Understands risks associated with med. Will not mix with other substances. Understands will be subjected to random tox screens. Unable to take NSAIDs secondary to renal disease. Need to cut back on pain meds. Will start decreasing by 10 %. Pt refusing pain management/.      COnstipation - Post op. Resolved. Psoriasis - Currently using topicals.      Bronchitis/COPD - Admitted into hospital. Started on spiriva. Tolerating well. Former smoker. Xray 7/2016 neg. Cont spiriva. AAA - Seeing vascular. Plans for repair. Contact cardio for CV clearance.  Surgery Giovanna Lavchrist 2018    Preventative; Flu vaccine 2016     PREVNAR 13 11/20/2015     Pneumovax 23 - 11/22/16      Colonoscopy - UTD.       Pain contract signed 8/23/16     Needs Tdap - 2/17/17   Patient given educational materials - see patient instructions. Discussed use, benefit, and side effects of prescribed medications. All patient questions answered. Pt voiced understanding. Reviewed health maintenance. Instructed to continue current medications, diet and exercise. Patient agreed with treatment plan. Follow up as directed.      Electronically signed by Jessica Araujo PA-C on 8/22/2018 at 3:00 PM

## 2018-08-29 ENCOUNTER — TELEPHONE (OUTPATIENT)
Dept: PHARMACY | Facility: CLINIC | Age: 68
End: 2018-08-29

## 2018-08-29 DIAGNOSIS — I71.40 ABDOMINAL AORTIC ANEURYSM (AAA) WITHOUT RUPTURE: Primary | ICD-10-CM

## 2018-08-29 PROCEDURE — 1111F DSCHRG MED/CURRENT MED MERGE: CPT | Performed by: PHARMACIST

## 2018-08-29 RX ORDER — ALPRAZOLAM 0.25 MG/1
0.25 TABLET ORAL 3 TIMES DAILY PRN
COMMUNITY
End: 2018-09-07 | Stop reason: SDUPTHER

## 2018-08-29 NOTE — TELEPHONE ENCOUNTER
CLINICAL PHARMACY NOTE  Post-Discharge Transitions of Care (EMI)    Subjective/Objective:  Kizzy Alfaro is a 77 y.o. male. Patient was discharged from [place] [address] on 18 with a diagnosis of AAA repair (18 media scan of discharge summary). Patient outreach to review discharge medications and provide medication review and management. Spoke with patient briefly; states no medications were changed from [facility] stay and medications were reviewed at  appt and are up to date/correct. Denies any medication questions or concerns. No Known Allergies    Current Outpatient Prescriptions   Medication Sig Dispense Refill    traMADol (ULTRAM) 50 MG tablet Take 1 tablet by mouth every 8 hours as needed for Pain for up to 30 days. . 200 tablet 0    lisinopril (PRINIVIL;ZESTRIL) 10 MG tablet take 1 tablet by mouth once daily 90 tablet 3    febuxostat (ULORIC) 40 MG TABS tablet Take 40 mg by mouth daily      ferrous sulfate 325 (65 Fe) MG tablet Take 325 mg by mouth daily (with breakfast)      simvastatin (ZOCOR) 20 MG tablet take 1 tablet by mouth every evening 30 tablet 5    NITROSTAT 0.3 MG SL tablet place 1 tablet under the tongue EVERY 5 MINUTES if needed for chest pain or CARDIAC SYMPTOMS. IF NO RELIEF AFTER 3 DOSES, GO TO ER/CALL 911 100 tablet 3    carvedilol (COREG) 25 MG tablet take 1 tablet by mouth twice a day with meals 180 tablet 3    isosorbide mononitrate (IMDUR) 30 MG extended release tablet take 1 tablet by mouth once daily 30 tablet 6    clobetasol (TEMOVATE) 0.05 % external solution    *completed - removed  *uses halobetasol cream prn - added apply to affected area twice a day 50 mL 1     Additional Medications:  Alprazolam - /fell off list - added back    Estimated Creatinine Clearance: 43 mL/min (based on SCr of 1.5 mg/dL). Assessment/Plan:  - Medication reconciliation completed. - Pt is taking medications as directed by discharging physician.    Number of discrepancies: 1. Instructions per discharge list provided except per below documentation. Identified medication discrepancies/issues:   · Category 4 (1):  1. Medication list updated as noted above    - Identified Potential Medication Interactions: No clinically significant interactions identified via [employer] Interaction Analysis as category D or higher.    - Renal Dosing: No renal adjustments necessary.     Ajit Aguero, PharmD, [address] [facility]  Direct: [phone]  Department, toll free: [phone], option 7     =======================================================   For Pharmacy Admin Tracking Only    TCM Call Made?: No  [facility] Select Patient?: Yes  Total # of Interventions Recommended: 1 -   - Updated Order #: 1  Total # Interventions Accepted: 1  Intervention Severity:   - Level 1 Intervention Present?: No   - Level 2 #: 0   - Level 3 #: 0  Outreach Status: Review Complete  Care Coordinator Outreach to Patient?: No  Provider Contacted?: No  Time Spent (min): 15

## 2018-09-07 DIAGNOSIS — F41.9 ANXIETY: Primary | ICD-10-CM

## 2018-09-07 RX ORDER — ALPRAZOLAM 0.25 MG/1
0.25 TABLET ORAL 3 TIMES DAILY PRN
Qty: 80 TABLET | Refills: 2 | Status: SHIPPED | OUTPATIENT
Start: 2018-09-07 | End: 2018-09-12

## 2018-09-07 NOTE — TELEPHONE ENCOUNTER
Last visit:8/17/2018  Last Med refill:    Next Visit Date:  Future Appointments  Date Time Provider Noemi Rosa   9/24/2018 2:30 PM SCHEDULE, [facility]       Health Maintenance   Topic Date Due    Hepatitis C screen  1950    Shingles Vaccine (1 of 2 - 2 Dose Series) 01/16/1998    Flu vaccine (1) 09/01/2018    Potassium monitoring  05/09/2019    Creatinine monitoring  05/09/2019    Colon cancer screen colonoscopy  02/05/2020    Lipid screen  05/10/2023    DTaP/Tdap/Td vaccine (2 - Td) 02/17/2027    Pneumococcal low/med risk  Completed       No results found for: LABA1C          ( goal A1C is < 7)   No results found for: LABMICR  LDL Calculated (mg/dL)   Date Value   05/10/2018 112   02/09/2018 86       (goal LDL is <100)   AST (U/L)   Date Value   05/09/2018 19     ALT (U/L)   Date Value   05/09/2018 21     BUN (mg/dL)   Date Value   05/09/2018 29     BP Readings from Last 3 Encounters:   08/17/18 124/82   07/06/18 (!) 145/81   05/08/18 126/82          (goal 120/80)    All Future Testing planned in [facility]  Lab Frequency Next Occurrence   Hepatitis C Antibody Once 11/14/2018   TSH with Reflex Once 11/14/2018   Vitamin D 25 Hydroxy Once 11/14/2018   Uric Acid Once 11/14/2018   Ferritin Once 02/12/2019   Ferritin Once 05/08/2019   Folate Once 05/08/2019   PTH, Intact With Ionized Calcium Once 05/08/2019   Protime-INR Once 05/08/2019   APTT Once 05/08/2019   XR CHEST STANDARD (2 VW) Once 05/08/2019   XR LUMBAR SPINE (2-3 VIEWS) Once 08/17/2019   XR SHOULDER LEFT (MIN 2 VIEWS) Once 09/16/2018               Patient Active Problem List:     Anxiety     Hyperlipidemia     Hypertension     Psoriasis     Renal insufficiency     Alcohol abuse     Anemia     CKD (chronic kidney disease) stage 3, GFR 30-59 ml/min     Ischemic cardiomyopathy     ASHD (arteriosclerotic heart disease)     COPD (chronic obstructive pulmonary disease) (HCC)     Recurrent Gout Chronic shoulder pain     Cardiomyopathy ([facility] [address]     Coronary artery disease involving native coronary artery of native heart without angina pectoris     AICD (automatic cardioverter/defibrillator) present     Chronic gout without tophus     LVH (left ventricular hypertrophy)     LAE (left atrial enlargement)     Abnormal echocardiogram     Other chronic pain     Iron deficiency     Abdominal aortic aneurysm (AAA) without rupture (HCC)     Chronic systolic CHF (congestive heart failure) ([facility] [address]

## 2018-09-12 ENCOUNTER — OFFICE VISIT (OUTPATIENT)
Dept: FAMILY MEDICINE CLINIC | Age: 68
End: 2018-09-12
Payer: MEDICARE

## 2018-09-12 VITALS
WEIGHT: 159.7 LBS | DIASTOLIC BLOOD PRESSURE: 66 MMHG | HEIGHT: 67 IN | OXYGEN SATURATION: 99 % | HEART RATE: 85 BPM | BODY MASS INDEX: 25.07 KG/M2 | TEMPERATURE: 97.2 F | SYSTOLIC BLOOD PRESSURE: 114 MMHG

## 2018-09-12 DIAGNOSIS — M10.9 ACUTE GOUT OF LEFT FOOT, UNSPECIFIED CAUSE: Primary | ICD-10-CM

## 2018-09-12 DIAGNOSIS — M79.89 PAIN AND SWELLING OF TOE OF LEFT FOOT: ICD-10-CM

## 2018-09-12 DIAGNOSIS — M79.675 PAIN AND SWELLING OF TOE OF LEFT FOOT: ICD-10-CM

## 2018-09-12 PROCEDURE — 99213 OFFICE O/P EST LOW 20 MIN: CPT | Performed by: PHYSICIAN ASSISTANT

## 2018-09-12 RX ORDER — PREDNISONE 20 MG/1
20 TABLET ORAL 2 TIMES DAILY
Qty: 10 TABLET | Refills: 0 | Status: SHIPPED | OUTPATIENT
Start: 2018-09-12 | End: 2018-09-17

## 2018-09-12 RX ORDER — ALPRAZOLAM 0.25 MG/1
0.25 TABLET ORAL
COMMUNITY
End: 2019-04-29

## 2018-09-12 RX ORDER — NITROGLYCERIN 0.3 MG/1
0.3 TABLET SUBLINGUAL
COMMUNITY

## 2018-09-12 RX ORDER — SIMVASTATIN 20 MG
20 TABLET ORAL
COMMUNITY
End: 2018-09-12

## 2018-09-12 RX ORDER — FEBUXOSTAT 40 MG/1
40 TABLET, FILM COATED ORAL
COMMUNITY
End: 2018-09-12

## 2018-09-12 RX ORDER — ISOSORBIDE MONONITRATE 30 MG/1
30 TABLET, EXTENDED RELEASE ORAL
COMMUNITY
End: 2020-09-04 | Stop reason: SDUPTHER

## 2018-09-12 RX ORDER — CARVEDILOL 12.5 MG/1
12.5 TABLET ORAL
COMMUNITY
End: 2019-04-29 | Stop reason: ALTCHOICE

## 2018-09-12 RX ORDER — TRAMADOL HYDROCHLORIDE 50 MG/1
50 TABLET ORAL
COMMUNITY
End: 2018-09-12

## 2018-09-12 ASSESSMENT — ENCOUNTER SYMPTOMS
ABDOMINAL PAIN: 0
COUGH: 0
NAUSEA: 0
VOMITING: 0
CHANGE IN BOWEL HABIT: 0
SWOLLEN GLANDS: 0
VISUAL CHANGE: 0
SORE THROAT: 0

## 2018-09-12 NOTE — PATIENT INSTRUCTIONS
Patient Education        Gout: Care Instructions  Your Care Instructions    Gout is a form of arthritis caused by a buildup of uric acid crystals in a joint. It causes sudden attacks of pain, swelling, redness, and stiffness, usually in one joint, especially the big toe. Gout usually comes on without a cause. But it can be brought on by drinking alcohol (especially beer) or eating seafood and red meat. Taking certain medicines, such as diuretics or aspirin, also can bring on an attack of gout. Taking your medicines as prescribed and following up with your doctor regularly can help you avoid gout attacks in the future. Follow-up care is a key part of your treatment and safety. Be sure to make and go to all appointments, and call your doctor if you are having problems. It's also a good idea to know your test results and keep a list of the medicines you take. How can you care for yourself at home? · If the joint is swollen, put ice or a cold pack on the area for 10 to 20 minutes at a time. Put a thin cloth between the ice and your skin. · Prop up the sore limb on a pillow when you ice it or anytime you sit or lie down during the next 3 days. Try to keep it above the level of your heart. This will help reduce swelling. · Rest sore joints. Avoid activities that put weight or strain on the joints for a few days. Take short rest breaks from your regular activities during the day. · Take your medicines exactly as prescribed. Call your doctor if you think you are having a problem with your medicine. · Take pain medicines exactly as directed. ¨ If the doctor gave you a prescription medicine for pain, take it as prescribed. ¨ If you are not taking a prescription pain medicine, ask your doctor if you can take an over-the-counter medicine. · Eat less seafood and red meat. · Check with your doctor before drinking alcohol. · Losing weight, if you are overweight, may help reduce attacks of gout.  But do not go on a \"crash diet. \" Losing a lot of weight in a short amount of time can cause a gout attack. When should you call for help? Call your doctor now or seek immediate medical care if:    · You have a fever.     · The joint is so painful you cannot use it.     · You have sudden, unexplained swelling, redness, warmth, or severe pain in one or more joints.    Watch closely for changes in your health, and be sure to contact your doctor if:    · You have joint pain.     · Your symptoms get worse or are not improving after 2 or 3 days. Where can you learn more? Go to https://[email or web address].[employer]. org and sign in to your [employer] account. Enter I618 in the [employer] box to learn more about \"Gout: Care Instructions. \"     If you do not have an account, please click on the \"Sign Up Now\" link. Current as of: October 10, 2017  Content Version: 11.7  © 5820-4326 [employer], [employer]. Care instructions adapted under license by [facility] ([facility]). If you have questions about a medical condition or this instruction, always ask your healthcare professional. [place] [address] any warranty or liability for your use of this information.

## 2018-09-12 NOTE — PROGRESS NOTES
[facility]  [address] [place] [address]  Phone: [phone]  Fax: [phone]       [facility] Name: Maryse Gordillo  MRN: [id number]  [place] 1950  Date of evaluation: 9/12/2018  Provider: Britany Duffy PA-C     [address] [place] [address]       Chief Complaint   Patient presents with    Foot Swelling     left foot-  three days ago           HISTORY OF PRESENT ILLNESS  (Location/Symptom, Timing/Onset, Context/Setting, Quality, Duration, Modifying Factors, Severity.)   Maryse Gordillo is a 77 y.o. White [1] male who presents to the office for evaluation of      Left foot swelling. Patient denies any injury or trauma. Patient states that this is a reoccurring issue for him. Patient states he does not want to be worked up for Gout. Patient states that when this has happened in the past he was worked up for gout and the Uric Acid always comes back normal. Patient states that he has been treated in the past for Gout and the normal treatment/ medications did not help at all. Patient states that the only treatment that work is oral Prednisone and Tylenol. Patient states he recently had surgery to repair an Abdominal Aortic Aneurysm. Foot Pain   This is a recurrent problem. The current episode started in the past 7 days. The problem occurs constantly. The problem has been gradually worsening. Pertinent negatives include no abdominal pain, anorexia, change in bowel habit, chest pain, chills, congestion, coughing, diaphoresis, fatigue, fever, headaches, nausea, neck pain, numbness, rash, sore throat, swollen glands, urinary symptoms, vertigo, visual change, vomiting or weakness. The symptoms are aggravated by walking and standing. He has tried acetaminophen for the symptoms. The treatment provided mild relief. Nursing Notes were reviewed.     REVIEW OF SYSTEMS    (2-9 systems for level 4, 10 or more for level 5)     Review of Systems   Constitutional: plan.  Educational materials provided on AVS.  Follow up if symptoms do not improve/worsen. Patient instructed to return to the office if symptoms worsen, return, or have any other concerns. Patient understands and is agreeable.          Vladislav Mancini PA-C 9/12/2018 1:02 PM

## 2018-09-16 DIAGNOSIS — N18.30 CKD (CHRONIC KIDNEY DISEASE) STAGE 3, GFR 30-59 ML/MIN (HCC): ICD-10-CM

## 2018-09-16 LAB — CREATININE, RANDOM URINE: 1.57

## 2018-09-27 PROBLEM — I48.0 PAF (PAROXYSMAL ATRIAL FIBRILLATION) (HCC): Status: ACTIVE | Noted: 2018-09-27

## 2018-10-17 RX ORDER — SIMVASTATIN 20 MG
TABLET ORAL
Qty: 90 TABLET | Refills: 1 | Status: SHIPPED | OUTPATIENT
Start: 2018-10-17

## 2019-04-29 PROBLEM — I47.20 VENTRICULAR TACHYCARDIA (HCC): Status: ACTIVE | Noted: 2019-04-29

## 2023-07-28 ENCOUNTER — HOSPITAL ENCOUNTER (OUTPATIENT)
Age: 73
Setting detail: SPECIMEN
Discharge: HOME | End: 2023-07-28

## 2023-07-28 LAB
25(OH)D3 SERPL-MCNC: 47.8 NG/ML
ALBUMIN SERPL-MCNC: 4.6 G/DL (ref 3.5–5.2)
ALBUMIN/GLOB SERPL: 1.5 {RATIO} (ref 1–2.5)
ALP SERPL-CCNC: 94 U/L (ref 40–129)
ALT SERPL-CCNC: 19 U/L (ref 5–41)
ANION GAP SERPL CALCULATED.3IONS-SCNC: 20 MMOL/L (ref 9–17)
AST SERPL-CCNC: 20 U/L
BILIRUB SERPL-MCNC: 0.3 MG/DL (ref 0.3–1.2)
BUN SERPL-MCNC: 25 MG/DL (ref 8–23)
CALCIUM SERPL-MCNC: 9.6 MG/DL (ref 8.6–10.4)
CHLORIDE SERPL-SCNC: 110 MMOL/L (ref 98–107)
CHOLEST SERPL-MCNC: 190 MG/DL
CHOLESTEROL/HDL RATIO: 4.1
CO2 SERPL-SCNC: 18 MMOL/L (ref 20–31)
CREAT SERPL-MCNC: 1.4 MG/DL (ref 0.7–1.2)
ERYTHROCYTE [DISTWIDTH] IN BLOOD BY AUTOMATED COUNT: 13.1 % (ref 11.8–14.4)
EST. AVERAGE GLUCOSE BLD GHB EST-MCNC: 128 MG/DL
GFR SERPL CREATININE-BSD FRML MDRD: 52 ML/MIN/1.73M2
GLUCOSE SERPL-MCNC: 99 MG/DL (ref 70–99)
HBA1C MFR BLD: 6.1 % (ref 4–6)
HCT VFR BLD AUTO: 39.2 % (ref 40.7–50.3)
HDLC SERPL-MCNC: 46 MG/DL
HGB BLD-MCNC: 12.6 G/DL (ref 13–17)
IRON SERPL-MCNC: 82 UG/DL (ref 59–158)
LDLC SERPL CALC-MCNC: 110 MG/DL (ref 0–130)
MCH RBC QN AUTO: 30 PG (ref 25.2–33.5)
MCHC RBC AUTO-ENTMCNC: 32.1 G/DL (ref 28.4–34.8)
MCV RBC AUTO: 93.3 FL (ref 82.6–102.9)
NRBC BLD-RTO: 0 PER 100 WBC
PLATELET # BLD AUTO: 310 K/UL (ref 138–453)
PMV BLD AUTO: 10.2 FL (ref 8.1–13.5)
POTASSIUM SERPL-SCNC: 5.1 MMOL/L (ref 3.7–5.3)
PROT SERPL-MCNC: 7.6 G/DL (ref 6.4–8.3)
PSA SERPL-MCNC: 0.46 NG/ML
RBC # BLD AUTO: 4.2 M/UL (ref 4.21–5.77)
SODIUM SERPL-SCNC: 148 MMOL/L (ref 135–144)
TRIGL SERPL-MCNC: 168 MG/DL
URATE SERPL-MCNC: 9.6 MG/DL (ref 3.4–7)
VIT B12 SERPL-MCNC: 468 PG/ML (ref 232–1245)
WBC OTHER # BLD: 11.5 K/UL (ref 3.5–11.3)

## 2023-11-03 ENCOUNTER — HOSPITAL ENCOUNTER (OUTPATIENT)
Age: 73
Setting detail: SPECIMEN
Discharge: HOME | End: 2023-11-03

## 2023-11-03 LAB
25(OH)D3 SERPL-MCNC: 54.9 NG/ML
ALBUMIN SERPL-MCNC: 4.3 G/DL (ref 3.5–5.2)
ALBUMIN/GLOB SERPL: 1.5 {RATIO} (ref 1–2.5)
ALP SERPL-CCNC: 100 U/L (ref 40–129)
ALT SERPL-CCNC: 15 U/L (ref 5–41)
ANION GAP SERPL CALCULATED.3IONS-SCNC: 11 MMOL/L (ref 9–17)
AST SERPL-CCNC: 19 U/L
BILIRUB SERPL-MCNC: 0.2 MG/DL (ref 0.3–1.2)
BUN SERPL-MCNC: 16 MG/DL (ref 8–23)
CALCIUM SERPL-MCNC: 9.5 MG/DL (ref 8.6–10.4)
CHLORIDE SERPL-SCNC: 108 MMOL/L (ref 98–107)
CHOLEST SERPL-MCNC: 195 MG/DL
CHOLESTEROL/HDL RATIO: 5
CO2 SERPL-SCNC: 22 MMOL/L (ref 20–31)
CREAT SERPL-MCNC: 1.2 MG/DL (ref 0.7–1.2)
ERYTHROCYTE [DISTWIDTH] IN BLOOD BY AUTOMATED COUNT: 13 % (ref 11.8–14.4)
EST. AVERAGE GLUCOSE BLD GHB EST-MCNC: 131 MG/DL
GFR SERPL CREATININE-BSD FRML MDRD: >60 ML/MIN/1.73M2
GLUCOSE SERPL-MCNC: 109 MG/DL (ref 70–99)
HBA1C MFR BLD: 6.2 % (ref 4–6)
HCT VFR BLD AUTO: 37.9 % (ref 40.7–50.3)
HDLC SERPL-MCNC: 39 MG/DL
HGB BLD-MCNC: 12.9 G/DL (ref 13–17)
IRON SERPL-MCNC: 59 UG/DL (ref 59–158)
LDLC SERPL CALC-MCNC: 114 MG/DL (ref 0–130)
MCH RBC QN AUTO: 32.8 PG (ref 25.2–33.5)
MCHC RBC AUTO-ENTMCNC: 34 G/DL (ref 28.4–34.8)
MCV RBC AUTO: 96.4 FL (ref 82.6–102.9)
NRBC BLD-RTO: 0 PER 100 WBC
PLATELET # BLD AUTO: 357 K/UL (ref 138–453)
PMV BLD AUTO: 9.7 FL (ref 8.1–13.5)
POTASSIUM SERPL-SCNC: 4.7 MMOL/L (ref 3.7–5.3)
PROT SERPL-MCNC: 7.2 G/DL (ref 6.4–8.3)
RBC # BLD AUTO: 3.93 M/UL (ref 4.21–5.77)
SODIUM SERPL-SCNC: 141 MMOL/L (ref 135–144)
TRIGL SERPL-MCNC: 210 MG/DL
URATE SERPL-MCNC: 7.8 MG/DL (ref 3.4–7)
VIT B12 SERPL-MCNC: 689 PG/ML (ref 232–1245)
WBC OTHER # BLD: 8.1 K/UL (ref 3.5–11.3)

## 2024-03-05 ENCOUNTER — HOSPITAL ENCOUNTER (OUTPATIENT)
Age: 74
Setting detail: SPECIMEN
Discharge: HOME | End: 2024-03-05

## 2024-03-05 LAB
25(OH)D3 SERPL-MCNC: 50.1 NG/ML (ref 30–100)
ALBUMIN SERPL-MCNC: 4.4 G/DL (ref 3.5–5.2)
ALBUMIN/GLOB SERPL: 1 {RATIO} (ref 1–2.5)
ALP SERPL-CCNC: 100 U/L (ref 40–129)
ALT SERPL-CCNC: 22 U/L (ref 10–50)
ANION GAP SERPL CALCULATED.3IONS-SCNC: 14 MMOL/L (ref 9–16)
AST SERPL-CCNC: 20 U/L (ref 10–50)
BILIRUB SERPL-MCNC: 0.2 MG/DL (ref 0–1.2)
BUN SERPL-MCNC: 23 MG/DL (ref 8–23)
CALCIUM SERPL-MCNC: 10.1 MG/DL (ref 8.6–10.4)
CHLORIDE SERPL-SCNC: 102 MMOL/L (ref 98–107)
CHOLEST SERPL-MCNC: 214 MG/DL (ref 0–199)
CHOLESTEROL/HDL RATIO: 4
CO2 SERPL-SCNC: 25 MMOL/L (ref 20–31)
CREAT SERPL-MCNC: 1.5 MG/DL (ref 0.7–1.2)
ERYTHROCYTE [DISTWIDTH] IN BLOOD BY AUTOMATED COUNT: 13 % (ref 11.8–14.4)
EST. AVERAGE GLUCOSE BLD GHB EST-MCNC: 123 MG/DL
GFR SERPL CREATININE-BSD FRML MDRD: 50 ML/MIN/1.73M2
GLUCOSE SERPL-MCNC: 91 MG/DL (ref 74–99)
HBA1C MFR BLD: 5.9 % (ref 4–6)
HCT VFR BLD AUTO: 42 % (ref 40.7–50.3)
HDLC SERPL-MCNC: 55 MG/DL
HGB BLD-MCNC: 13.7 G/DL (ref 13–17)
IRON SERPL-MCNC: 68 UG/DL (ref 61–157)
LDLC SERPL CALC-MCNC: 123 MG/DL (ref 0–100)
MCH RBC QN AUTO: 30.6 PG (ref 25.2–33.5)
MCHC RBC AUTO-ENTMCNC: 32.6 G/DL (ref 28.4–34.8)
MCV RBC AUTO: 94 FL (ref 82.6–102.9)
NRBC BLD-RTO: 0 PER 100 WBC
PLATELET # BLD AUTO: 427 K/UL (ref 138–453)
PMV BLD AUTO: 9.9 FL (ref 8.1–13.5)
POTASSIUM SERPL-SCNC: 4.4 MMOL/L (ref 3.7–5.3)
PROT SERPL-MCNC: 7.4 G/DL (ref 6.6–8.7)
RBC # BLD AUTO: 4.47 M/UL (ref 4.21–5.77)
SODIUM SERPL-SCNC: 141 MMOL/L (ref 136–145)
TRIGL SERPL-MCNC: 177 MG/DL
URATE SERPL-MCNC: 8.9 MG/DL (ref 3.4–7)
VIT B12 SERPL-MCNC: 606 PG/ML (ref 232–1245)
VLDLC SERPL CALC-MCNC: 35 MG/DL
WBC OTHER # BLD: 10 K/UL (ref 3.5–11.3)

## 2024-07-03 ENCOUNTER — HOSPITAL ENCOUNTER (OUTPATIENT)
Age: 74
Setting detail: SPECIMEN
Discharge: HOME | End: 2024-07-03

## 2024-07-03 LAB
25(OH)D3 SERPL-MCNC: 44.2 NG/ML (ref 30–100)
ALBUMIN SERPL-MCNC: 4.5 G/DL (ref 3.5–5.2)
ALBUMIN/GLOB SERPL: 2 {RATIO} (ref 1–2.5)
ALP SERPL-CCNC: 87 U/L (ref 40–129)
ALT SERPL-CCNC: 15 U/L (ref 10–50)
ANION GAP SERPL CALCULATED.3IONS-SCNC: 12 MMOL/L (ref 9–16)
AST SERPL-CCNC: 25 U/L (ref 10–50)
BILIRUB SERPL-MCNC: 0.2 MG/DL (ref 0–1.2)
BUN SERPL-MCNC: 19 MG/DL (ref 8–23)
CALCIUM SERPL-MCNC: 9.9 MG/DL (ref 8.6–10.4)
CHLORIDE SERPL-SCNC: 101 MMOL/L (ref 98–107)
CHOLEST SERPL-MCNC: 165 MG/DL (ref 0–199)
CHOLESTEROL/HDL RATIO: 5
CO2 SERPL-SCNC: 25 MMOL/L (ref 20–31)
CREAT SERPL-MCNC: 1.3 MG/DL (ref 0.7–1.2)
ERYTHROCYTE [DISTWIDTH] IN BLOOD BY AUTOMATED COUNT: 12.1 % (ref 11.8–14.4)
EST. AVERAGE GLUCOSE BLD GHB EST-MCNC: 128 MG/DL
GFR, ESTIMATED: 57 ML/MIN/1.73M2
GLUCOSE SERPL-MCNC: 99 MG/DL (ref 74–99)
HBA1C MFR BLD: 6.1 % (ref 4–6)
HCT VFR BLD AUTO: 40 % (ref 40.7–50.3)
HDLC SERPL-MCNC: 36 MG/DL
HGB BLD-MCNC: 13.2 G/DL (ref 13–17)
IRON SERPL-MCNC: 47 UG/DL (ref 61–157)
LDLC SERPL CALC-MCNC: 85 MG/DL (ref 0–100)
MCH RBC QN AUTO: 30.6 PG (ref 25.2–33.5)
MCHC RBC AUTO-ENTMCNC: 33 G/DL (ref 28.4–34.8)
MCV RBC AUTO: 92.8 FL (ref 82.6–102.9)
NRBC BLD-RTO: 0 PER 100 WBC
PLATELET # BLD AUTO: 308 K/UL (ref 138–453)
PMV BLD AUTO: 10.6 FL (ref 8.1–13.5)
POTASSIUM SERPL-SCNC: 4.3 MMOL/L (ref 3.7–5.3)
PROT SERPL-MCNC: 7.5 G/DL (ref 6.6–8.7)
RBC # BLD AUTO: 4.31 M/UL (ref 4.21–5.77)
SODIUM SERPL-SCNC: 138 MMOL/L (ref 136–145)
TRIGL SERPL-MCNC: 223 MG/DL
URATE SERPL-MCNC: 8.7 MG/DL (ref 3.4–7)
VIT B12 SERPL-MCNC: 608 PG/ML (ref 232–1245)
VLDLC SERPL CALC-MCNC: 45 MG/DL
WBC OTHER # BLD: 10.7 K/UL (ref 3.5–11.3)

## 2024-10-24 ENCOUNTER — HOSPITAL ENCOUNTER (OUTPATIENT)
Age: 74
Setting detail: SPECIMEN
Discharge: HOME | End: 2024-10-24

## 2024-10-24 LAB
25(OH)D3 SERPL-MCNC: 41.5 NG/ML (ref 30–100)
ALBUMIN SERPL-MCNC: 4.3 G/DL (ref 3.5–5.2)
ALBUMIN/GLOB SERPL: 1 {RATIO} (ref 1–2.5)
ALP SERPL-CCNC: 78 U/L (ref 40–129)
ALT SERPL-CCNC: 46 U/L (ref 10–50)
ANION GAP SERPL CALCULATED.3IONS-SCNC: 13 MMOL/L (ref 9–16)
AST SERPL-CCNC: 51 U/L (ref 10–50)
BILIRUB SERPL-MCNC: 0.2 MG/DL (ref 0–1.2)
BUN SERPL-MCNC: 25 MG/DL (ref 8–23)
CALCIUM SERPL-MCNC: 10.1 MG/DL (ref 8.6–10.4)
CHLORIDE SERPL-SCNC: 103 MMOL/L (ref 98–107)
CHOLEST SERPL-MCNC: 191 MG/DL (ref 0–199)
CHOLESTEROL/HDL RATIO: 4
CO2 SERPL-SCNC: 24 MMOL/L (ref 20–31)
CREAT SERPL-MCNC: 1.3 MG/DL (ref 0.7–1.2)
ERYTHROCYTE [DISTWIDTH] IN BLOOD BY AUTOMATED COUNT: 13.2 % (ref 11.8–14.4)
EST. AVERAGE GLUCOSE BLD GHB EST-MCNC: 128 MG/DL
GFR, ESTIMATED: 60 ML/MIN/1.73M2
GLUCOSE SERPL-MCNC: 110 MG/DL (ref 74–99)
HBA1C MFR BLD: 6.1 % (ref 4–6)
HCT VFR BLD AUTO: 39.1 % (ref 40.7–50.3)
HDLC SERPL-MCNC: 46 MG/DL
HGB BLD-MCNC: 12.8 G/DL (ref 13–17)
IRON SERPL-MCNC: 88 UG/DL (ref 61–157)
LDLC SERPL CALC-MCNC: 104 MG/DL (ref 0–100)
MCH RBC QN AUTO: 30 PG (ref 25.2–33.5)
MCHC RBC AUTO-ENTMCNC: 32.7 G/DL (ref 28.4–34.8)
MCV RBC AUTO: 91.6 FL (ref 82.6–102.9)
NRBC BLD-RTO: 0 PER 100 WBC
PLATELET # BLD AUTO: 348 K/UL (ref 138–453)
PMV BLD AUTO: 10.4 FL (ref 8.1–13.5)
POTASSIUM SERPL-SCNC: 4 MMOL/L (ref 3.7–5.3)
PROT SERPL-MCNC: 7.2 G/DL (ref 6.6–8.7)
RBC # BLD AUTO: 4.27 M/UL (ref 4.21–5.77)
SODIUM SERPL-SCNC: 140 MMOL/L (ref 136–145)
TRIGL SERPL-MCNC: 205 MG/DL
URATE SERPL-MCNC: 5.7 MG/DL (ref 3.4–7)
VIT B12 SERPL-MCNC: 587 PG/ML (ref 232–1245)
VLDLC SERPL CALC-MCNC: 41 MG/DL
WBC OTHER # BLD: 15.2 K/UL (ref 3.5–11.3)